# Patient Record
Sex: MALE | Race: WHITE | NOT HISPANIC OR LATINO | URBAN - METROPOLITAN AREA
[De-identification: names, ages, dates, MRNs, and addresses within clinical notes are randomized per-mention and may not be internally consistent; named-entity substitution may affect disease eponyms.]

---

## 2023-04-05 ENCOUNTER — OFFICE VISIT (OUTPATIENT)
Dept: PRIMARY CARE | Facility: CLINIC | Age: 83
End: 2023-04-05
Payer: MEDICARE

## 2023-04-05 VITALS
WEIGHT: 169 LBS | BODY MASS INDEX: 25.61 KG/M2 | OXYGEN SATURATION: 94 % | HEIGHT: 68 IN | SYSTOLIC BLOOD PRESSURE: 138 MMHG | HEART RATE: 124 BPM | DIASTOLIC BLOOD PRESSURE: 62 MMHG

## 2023-04-05 DIAGNOSIS — I15.2 HYPERTENSION ASSOCIATED WITH DIABETES (MULTI): ICD-10-CM

## 2023-04-05 DIAGNOSIS — I25.810 CORONARY ARTERY DISEASE INVOLVING CORONARY BYPASS GRAFT OF NATIVE HEART WITHOUT ANGINA PECTORIS: ICD-10-CM

## 2023-04-05 DIAGNOSIS — E11.59 HYPERTENSION ASSOCIATED WITH DIABETES (MULTI): ICD-10-CM

## 2023-04-05 DIAGNOSIS — E11.51 DM (DIABETES MELLITUS) TYPE II, CONTROLLED, WITH PERIPHERAL VASCULAR DISORDER (MULTI): ICD-10-CM

## 2023-04-05 DIAGNOSIS — A49.9 BACTERIAL INFECTION: ICD-10-CM

## 2023-04-05 DIAGNOSIS — R10.9 FLANK PAIN: Primary | ICD-10-CM

## 2023-04-05 DIAGNOSIS — F51.04 CHRONIC INSOMNIA: ICD-10-CM

## 2023-04-05 DIAGNOSIS — C83.38 DIFFUSE LARGE B-CELL LYMPHOMA, LYMPH NODES OF MULTIPLE SITES (MULTI): ICD-10-CM

## 2023-04-05 DIAGNOSIS — R05.9 COUGH, UNSPECIFIED TYPE: ICD-10-CM

## 2023-04-05 PROBLEM — I25.10 CAD (CORONARY ARTERY DISEASE): Status: ACTIVE | Noted: 2023-04-05

## 2023-04-05 PROBLEM — K21.9 GERD (GASTROESOPHAGEAL REFLUX DISEASE): Status: ACTIVE | Noted: 2023-04-05

## 2023-04-05 PROBLEM — N40.0 BPH (BENIGN PROSTATIC HYPERPLASIA): Status: ACTIVE | Noted: 2023-04-05

## 2023-04-05 PROBLEM — K51.90 ULCERATIVE COLITIS (MULTI): Status: ACTIVE | Noted: 2020-08-28

## 2023-04-05 PROBLEM — E78.2 COMBINED HYPERLIPIDEMIA ASSOCIATED WITH TYPE 2 DIABETES MELLITUS (MULTI): Status: ACTIVE | Noted: 2023-04-05

## 2023-04-05 PROBLEM — E11.69 COMBINED HYPERLIPIDEMIA ASSOCIATED WITH TYPE 2 DIABETES MELLITUS (MULTI): Status: ACTIVE | Noted: 2023-04-05

## 2023-04-05 PROCEDURE — 3075F SYST BP GE 130 - 139MM HG: CPT | Performed by: EMERGENCY MEDICINE

## 2023-04-05 PROCEDURE — 99214 OFFICE O/P EST MOD 30 MIN: CPT | Performed by: EMERGENCY MEDICINE

## 2023-04-05 PROCEDURE — 1036F TOBACCO NON-USER: CPT | Performed by: EMERGENCY MEDICINE

## 2023-04-05 PROCEDURE — 1159F MED LIST DOCD IN RCRD: CPT | Performed by: EMERGENCY MEDICINE

## 2023-04-05 PROCEDURE — 1157F ADVNC CARE PLAN IN RCRD: CPT | Performed by: EMERGENCY MEDICINE

## 2023-04-05 PROCEDURE — 3078F DIAST BP <80 MM HG: CPT | Performed by: EMERGENCY MEDICINE

## 2023-04-05 RX ORDER — SPIRONOLACTONE AND HYDROCHLOROTHIAZIDE 25; 25 MG/1; MG/1
1 TABLET ORAL
COMMUNITY
Start: 2020-07-23 | End: 2024-02-16 | Stop reason: ALTCHOICE

## 2023-04-05 RX ORDER — LISINOPRIL 20 MG/1
20 TABLET ORAL DAILY
COMMUNITY
Start: 2018-11-17

## 2023-04-05 RX ORDER — RIVAROXABAN 20 MG/1
20 TABLET, FILM COATED ORAL DAILY
COMMUNITY
Start: 2014-06-01

## 2023-04-05 RX ORDER — NITROGLYCERIN 0.4 MG/1
0.4 TABLET SUBLINGUAL
COMMUNITY
Start: 2019-02-20

## 2023-04-05 ASSESSMENT — PATIENT HEALTH QUESTIONNAIRE - PHQ9
2. FEELING DOWN, DEPRESSED OR HOPELESS: NOT AT ALL
1. LITTLE INTEREST OR PLEASURE IN DOING THINGS: NOT AT ALL
SUM OF ALL RESPONSES TO PHQ9 QUESTIONS 1 AND 2: 0

## 2023-04-05 NOTE — PROGRESS NOTES
Subjective   Patient ID: Moshe Velázquez is a 82 y.o. male who presents for Follow-up (Follow up, Has only had 5 hr sleep. Had colonoscopy done, has sore throat now is feeling like he is getting sick /).    Assessment/Plan   Problem List Items Addressed This Visit          Nervous    Flank pain - Primary    Chronic insomnia       Circulatory    CAD (coronary artery disease)    Relevant Medications    nitroglycerin (Nitrostat) 0.4 mg SL tablet    DM (diabetes mellitus) type II, controlled, with peripheral vascular disorder (CMS/HCC)    Hypertension associated with diabetes (CMS/HCC)       Other    Diffuse large b-cell lymphoma, lymph nodes of multiple sites (CMS/HCC)     Cough- z-gomez ordered   May use OTC medications as needed     Right flank pain- Improving with physical therapy, CT abd pelvis negative.    BPH- on Flomax    CAD- s/p CABG August 2018. Follows with Dr. Jefferson    Diabetes Type 2- Stable. Diet controlled.     History of Burkitt's lymphoma/leukemia- Stable. Following with Dr. Johnson     DVT- on Xarelto    Hypertension- on Lisinopril 20mg, Norvasc 5 mg and Aldactone-HCTZ 25-25mg daily.     Hyperlipidemia- continue Simvastatin and Fenofibrate    Insomnia- on ambien     Follow up in 3 months or sooner as needed     Source of history: Nurse, Medical personnel, Medical record, Patient.  History limitation: None.    HPI  Right flank pain improve with physical therapy. Has not experienced pain in over a week.   CT completed on 1/25/22, negative.     Recently had EGD done with GI. Prior to procedure he felt as if he was getting a cold. Since procedure sore throat and cough have worsened. Patient has been unable to sleep due to symptoms.     Reports that he has a calcification on his kidney that has been watched by Dr. Johnson for some time.     History of CAD s/p CABG, DVT (on xarelto), HTN, HLD, DM2, Burkitt's lymphoma, Insomnia.     Social history- quit smoking 15 years ago, no EtOH, or illicit drug use.  "    Surgical history- CABGx3,PCI, Appendectomy, Colectomy       No Known Allergies    Current Outpatient Medications   Medication Sig Dispense Refill    aspirin 81 mg capsule Take by mouth.      lisinopril 20 mg tablet 1 tablet (20 mg).      nitroglycerin (Nitrostat) 0.4 mg SL tablet Place 1 tablet (0.4 mg) under the tongue.      spironolacton-hydrochlorothiaz (Aldactazide) 25-25 mg tablet Take 1 tablet (25 mg) by mouth.      Xarelto 20 mg tablet 1 tablet (20 mg).      folic acid/multivit-min/lutein (CENTRUM SILVER ORAL)        No current facility-administered medications for this visit.       Objective   Visit Vitals  /62   Pulse (!) 124   Ht 1.727 m (5' 8\")   Wt 76.7 kg (169 lb)   SpO2 94%   BMI 25.70 kg/m²   Smoking Status Former   BSA 1.92 m²     Physical Exam  Vital signs as per nursing/MA documentation   General appearance: Alert and in no acute distress  HEENT: Normal Inspection   Neck: Normal Inspection   Respiratory: No respiratory distress Lungs are clear   Cardiovascular: Heart rate normal. No gallop  Back: Normal Inspection   Skin inspection: Warm   Musculoskeletal: No deformities   Neuro: Limited exam. Baseline    Review of Systems   Comprehensive review of systems as allowed by patient condition and nursing input is negative    No visits with results within 4 Month(s) from this visit.   Latest known visit with results is:   Legacy Encounter on 05/19/2021   Component Date Value Ref Range Status    Glucose 05/19/2021 106 (H)  74 - 99 mg/dL Final    Sodium 05/19/2021 140  136 - 145 mmol/L Final    Potassium 05/19/2021 4.9  3.5 - 5.3 mmol/L Final    Chloride 05/19/2021 103  98 - 107 mmol/L Final    Bicarbonate 05/19/2021 26  21 - 32 mmol/L Final    Anion Gap 05/19/2021 16  10 - 20 mmol/L Final    Urea Nitrogen 05/19/2021 25 (H)  6 - 23 mg/dL Final    Creatinine 05/19/2021 1.22  0.50 - 1.30 mg/dL Final    GLOMERULAR FILTRATION RATE-NON AFR* 05/19/2021 57 (A)  >60 mL/min/1.73m2 Final    GLOMERULAR " FILTRATION RATE-* 05/19/2021 69  >60 mL/min/1.73m2 Final    Calcium 05/19/2021 10.1  8.6 - 10.6 mg/dL Final    Albumin 05/19/2021 4.3  3.4 - 5.0 g/dL Final    Alkaline Phosphatase 05/19/2021 25 (L)  33 - 136 U/L Final    Total Protein 05/19/2021 6.5  6.4 - 8.2 g/dL Final    AST 05/19/2021 16  9 - 39 U/L Final    Total Bilirubin 05/19/2021 0.6  0.0 - 1.2 mg/dL Final    ALT (SGPT) 05/19/2021 14  10 - 52 U/L Final    Cholesterol 05/19/2021 132  0 - 199 mg/dL Final    HDL 05/19/2021 51.6  mg/dL Final    Cholesterol/HDL Ratio 05/19/2021 2.6   Final    LDL 05/19/2021 60  0 - 99 mg/dL Final    VLDL 05/19/2021 20  0 - 40 mg/dL Final    Triglycerides 05/19/2021 100  0 - 149 mg/dL Final    WBC 05/19/2021 7.3  4.4 - 11.3 x10E9/L Final    nRBC 05/19/2021 0.0  0.0 - 0.0 /100 WBC Final    RBC 05/19/2021 3.91 (L)  4.50 - 5.90 x10E12/L Final    Hemoglobin 05/19/2021 12.5 (L)  13.5 - 17.5 g/dL Final    Hematocrit 05/19/2021 38.9 (L)  41.0 - 52.0 % Final    MCV 05/19/2021 99  80 - 100 fL Final    MCHC 05/19/2021 32.1  32.0 - 36.0 g/dL Final    Platelets 05/19/2021 266  150 - 450 x10E9/L Final    RDW 05/19/2021 14.2  11.5 - 14.5 % Final    Vitamin D, 25-Hydroxy 05/19/2021 28 (A)  ng/mL Final    Hemoglobin A1C 05/19/2021 6.1  % Final    Estimated Average Glucose 05/19/2021 128  MG/DL Final    DRUG SCREEN COMMENT URINE 05/19/2021 SEE BELOW   Final    Creatine, Urine 05/19/2021 94.4  mg/dL Final    Amphetamine Screen, Urine 05/19/2021 PRESUMPTIVE NEGATIVE  NEGATIVE Final    Barbiturate Screen, Urine 05/19/2021 PRESUMPTIVE NEGATIVE  NEGATIVE Final    Cannabinoid Screen, Urine 05/19/2021 PRESUMPTIVE NEGATIVE  NEGATIVE Final    Cocaine Screen, Urine 05/19/2021 PRESUMPTIVE NEGATIVE  NEGATIVE Final    PCP Screen, Urine 05/19/2021 PRESUMPTIVE NEGATIVE  NEGATIVE Final    7-Aminoclonazepam 05/19/2021 <25  Cutoff <25 ng/mL Final    Alpha-Hydroxyalprazolam 05/19/2021 <25  Cutoff <25 ng/mL Final    Alpha-Hydroxymidazolam 05/19/2021 <25   Cutoff <25 ng/mL Final    Alprazolam 05/19/2021 <25  Cutoff <25 ng/mL Final    Chlordiazepoxide 05/19/2021 <25  Cutoff <25 ng/mL Final    Clonazepam 05/19/2021 <25  Cutoff <25 ng/mL Final    Diazepam 05/19/2021 <25  Cutoff <25 ng/mL Final    Lorazepam 05/19/2021 <25  Cutoff <25 ng/mL Final    Midazolam 05/19/2021 <25  Cutoff <25 ng/mL Final    Nordiazepam 05/19/2021 <25  Cutoff <25 ng/mL Final    Oxazepam 05/19/2021 <25  Cutoff <25 ng/mL Final    Temazepam 05/19/2021 <25  Cutoff <25 ng/mL Final    Zolpidem 05/19/2021 <25  Cutoff <25 ng/mL Final    Zolpidem Metabolite (ZCA) 05/19/2021 183 (A)  Cutoff <25 ng/mL Final    6-Acetylmorphine 05/19/2021 <25  Cutoff <25 ng/mL Final    Codeine 05/19/2021 <50  Cutoff <50 ng/mL Final    Hydrocodone 05/19/2021 <25  Cutoff <25 ng/mL Final    Hydromorphone 05/19/2021 <25  Cutoff <25 ng/mL Final    Morphine Urine 05/19/2021 <50  Cutoff <50 ng/mL Final    Norhydrocodone 05/19/2021 <25  Cutoff <25 ng/mL Final    Noroxycodone 05/19/2021 <25  Cutoff <25 ng/mL Final    Oxycodone 05/19/2021 <25  Cutoff <25 ng/mL Final    Oxymorphone 05/19/2021 <25  Cutoff <25 ng/mL Final    Tramadol 05/19/2021 <50  Cutoff <50 ng/mL Final    O-Desmethyltramadol 05/19/2021 <50  Cutoff <50 ng/mL Final    Fentanyl 05/19/2021 <2.5  Cutoff<2.5 ng/mL Final    Norfentanyl 05/19/2021 <2.5  Cutoff<2.5 ng/mL Final    METHADONE CONFIRMATION,URINE 05/19/2021 <25  Cutoff <25 ng/mL Final    EDDP 05/19/2021 <25  Cutoff <25 ng/mL Final    TSH 05/19/2021 1.50  0.44 - 3.98 mIU/L Final    Prostate Specific Antigen,Screen 05/19/2021 0.89  0.00 - 4.00 ng/mL Final       Radiology: Reviewed imaging in powerchart.  No results found.    No family history on file.  Social History     Socioeconomic History    Marital status:      Spouse name: None    Number of children: None    Years of education: None    Highest education level: None   Occupational History    None   Tobacco Use    Smoking status: Former     Types:  Cigarettes    Smokeless tobacco: Never   Vaping Use    Vaping status: None   Substance and Sexual Activity    Alcohol use: Not Currently    Drug use: None    Sexual activity: None   Other Topics Concern    None   Social History Narrative    None     Social Determinants of Health     Financial Resource Strain: Not on file   Food Insecurity: Not on file   Transportation Needs: Not on file   Physical Activity: Not on file   Stress: Not on file   Social Connections: Not on file   Intimate Partner Violence: Not on file   Housing Stability: Not on file     Past Medical History:   Diagnosis Date    Diverticulosis of intestine, part unspecified, without perforation or abscess without bleeding     Diverticulosis    Other hemorrhoids     Internal hemorrhoids    Personal history of colonic polyps     History of colonic polyps    Personal history of other diseases of the respiratory system     History of sinusitis    Personal history of other venous thrombosis and embolism     History of deep venous thrombosis    Personal history of pulmonary embolism     Personal history of pulmonary embolism     Past Surgical History:   Procedure Laterality Date    CHOLECYSTECTOMY  06/23/2014    Cholecystectomy    TONSILLECTOMY  06/23/2014    Tonsillectomy       Scribe Attestation  By signing my name below, ITanya Scribe   attest that this documentation has been prepared under the direction and in the presence of Isidro Fry MD.

## 2023-04-06 ENCOUNTER — TELEPHONE (OUTPATIENT)
Dept: PRIMARY CARE | Facility: CLINIC | Age: 83
End: 2023-04-06
Payer: MEDICARE

## 2023-04-06 RX ORDER — AZITHROMYCIN 250 MG/1
250 TABLET, FILM COATED ORAL DAILY
Qty: 6 TABLET | Refills: 0 | Status: SHIPPED | OUTPATIENT
Start: 2023-04-06 | End: 2023-04-11

## 2023-04-06 NOTE — ADDENDUM NOTE
Addended by: WEST MULLER on: 4/6/2023 03:57 PM     Modules accepted: Orders    
[facility] [phone]. Initial visit will be day after discharge home. A nurse will call prior to home visit

## 2023-04-06 NOTE — TELEPHONE ENCOUNTER
Pt was told that zpak would be sent to the  pharmacy but it hasnt been sent yet  Pt is extremly upset  [facility]

## 2023-04-10 ENCOUNTER — OFFICE VISIT (OUTPATIENT)
Dept: PRIMARY CARE | Facility: CLINIC | Age: 83
End: 2023-04-10
Payer: MEDICARE

## 2023-04-10 VITALS
OXYGEN SATURATION: 93 % | BODY MASS INDEX: 25.34 KG/M2 | DIASTOLIC BLOOD PRESSURE: 63 MMHG | WEIGHT: 167.2 LBS | HEART RATE: 91 BPM | HEIGHT: 68 IN | SYSTOLIC BLOOD PRESSURE: 108 MMHG

## 2023-04-10 DIAGNOSIS — E78.2 COMBINED HYPERLIPIDEMIA ASSOCIATED WITH TYPE 2 DIABETES MELLITUS (MULTI): ICD-10-CM

## 2023-04-10 DIAGNOSIS — R10.9 FLANK PAIN: ICD-10-CM

## 2023-04-10 DIAGNOSIS — I25.810 ATHEROSCLEROSIS OF CORONARY ARTERY BYPASS GRAFT OF NATIVE HEART WITHOUT ANGINA PECTORIS: ICD-10-CM

## 2023-04-10 DIAGNOSIS — I15.2 HYPERTENSION ASSOCIATED WITH DIABETES (MULTI): ICD-10-CM

## 2023-04-10 DIAGNOSIS — E11.69 COMBINED HYPERLIPIDEMIA ASSOCIATED WITH TYPE 2 DIABETES MELLITUS (MULTI): ICD-10-CM

## 2023-04-10 DIAGNOSIS — A49.9 BACTERIAL INFECTION: Primary | ICD-10-CM

## 2023-04-10 DIAGNOSIS — E11.59 HYPERTENSION ASSOCIATED WITH DIABETES (MULTI): ICD-10-CM

## 2023-04-10 DIAGNOSIS — E11.51 DM (DIABETES MELLITUS) TYPE II, CONTROLLED, WITH PERIPHERAL VASCULAR DISORDER (MULTI): ICD-10-CM

## 2023-04-10 DIAGNOSIS — F51.04 CHRONIC INSOMNIA: ICD-10-CM

## 2023-04-10 PROCEDURE — 1159F MED LIST DOCD IN RCRD: CPT | Performed by: EMERGENCY MEDICINE

## 2023-04-10 PROCEDURE — 99213 OFFICE O/P EST LOW 20 MIN: CPT | Performed by: EMERGENCY MEDICINE

## 2023-04-10 PROCEDURE — 3074F SYST BP LT 130 MM HG: CPT | Performed by: EMERGENCY MEDICINE

## 2023-04-10 PROCEDURE — 1157F ADVNC CARE PLAN IN RCRD: CPT | Performed by: EMERGENCY MEDICINE

## 2023-04-10 PROCEDURE — 3078F DIAST BP <80 MM HG: CPT | Performed by: EMERGENCY MEDICINE

## 2023-04-10 RX ORDER — DOXYCYCLINE HYCLATE 100 MG
100 TABLET ORAL 2 TIMES DAILY
Qty: 20 TABLET | Refills: 0 | Status: SHIPPED | OUTPATIENT
Start: 2023-04-10 | End: 2023-04-20

## 2023-04-10 ASSESSMENT — PATIENT HEALTH QUESTIONNAIRE - PHQ9
1. LITTLE INTEREST OR PLEASURE IN DOING THINGS: NOT AT ALL
SUM OF ALL RESPONSES TO PHQ9 QUESTIONS 1 AND 2: 0
2. FEELING DOWN, DEPRESSED OR HOPELESS: NOT AT ALL

## 2023-04-10 NOTE — PROGRESS NOTES
Subjective   Patient ID: Moshe Velázquez is a 82 y.o. male who presents for Nasal Congestion (Here for congestion/ runny nose, has been having trouble sleeping. Has been on antibiotic for 5 days, feels like it has not been working. ).    Assessment/Plan   Problem List Items Addressed This Visit          Nervous    Flank pain    Chronic insomnia       Circulatory    Atherosclerosis of coronary artery bypass graft of native heart without angina pectoris    DM (diabetes mellitus) type II, controlled, with peripheral vascular disorder (CMS/HCC)    Hypertension associated with diabetes (CMS/HCC)       Endocrine/Metabolic    Combined hyperlipidemia associated with type 2 diabetes mellitus (CMS/HCC)     Other Visit Diagnoses       Bacterial infection    -  Primary    Relevant Medications    doxycycline (Vibra-Tabs) 100 mg tablet          Cough- Doxy ordered   Completed z-gomez without significant symptom improvement     Right flank pain- Improving with physical therapy, CT abd pelvis negative.    BPH- on Flomax    CAD- s/p CABG August 2018. Follows with Dr. Jefferson    Diabetes Type 2- Stable. Diet controlled.     History of Burkitt's lymphoma/leukemia- Stable. Following with Dr. Johnson     DVT- on Xarelto    Hypertension- on Lisinopril 20mg, Norvasc 5 mg and Aldactone-HCTZ 25-25mg daily.     Hyperlipidemia- continue Simvastatin and Fenofibrate    Insomnia- on ambien     Follow up in 3 months or sooner as needed     Source of history: Nurse, Medical personnel, Medical record, Patient.  History limitation: None.    HPI  Patient started on z-gomez last week for acute bronchitis. Completed antibiotic course but does not feel that his symptoms have improved.   Has been unable to sleep for long than 2 hours at a time due to persistent cough.     Reports that he has a calcification on his kidney that has been watched by Dr. Johnson for some time.     History of CAD s/p CABG, DVT (on xarelto), HTN, HLD, DM2, Burkitt's lymphoma,  "Insomnia.     Social history- quit smoking 15 years ago, no EtOH, or illicit drug use.     Surgical history- CABGx3,PCI, Appendectomy, Colectomy       No Known Allergies    Current Outpatient Medications   Medication Sig Dispense Refill    aspirin 81 mg capsule Take by mouth.      azithromycin (Zithromax) 250 mg tablet Take 1 tablet (250 mg) by mouth once daily for 5 days. Two pills on first day followed by one pill for the next four days 6 tablet 0    folic acid/multivit-min/lutein (CENTRUM SILVER ORAL)       lisinopril 20 mg tablet 1 tablet (20 mg).      spironolacton-hydrochlorothiaz (Aldactazide) 25-25 mg tablet Take 1 tablet (25 mg) by mouth.      Xarelto 20 mg tablet 1 tablet (20 mg).      doxycycline (Vibra-Tabs) 100 mg tablet Take 1 tablet (100 mg) by mouth in the morning and 1 tablet (100 mg) before bedtime. Do all this for 10 days. Take with a full glass of water and do not lie down for at least 30 minutes after.. 20 tablet 0    nitroglycerin (Nitrostat) 0.4 mg SL tablet Place 1 tablet (0.4 mg) under the tongue.       No current facility-administered medications for this visit.       Objective   Visit Vitals  /63   Pulse 91   Ht 1.727 m (5' 8\")   Wt 75.8 kg (167 lb 3.2 oz)   SpO2 93%   BMI 25.42 kg/m²   Smoking Status Some Days   BSA 1.91 m²     Physical Exam  Vital signs as per nursing/MA documentation   General appearance: Alert and in no acute distress  HEENT: Normal Inspection   Neck: Normal Inspection   Respiratory: No respiratory distress Lungs are clear   Cardiovascular: Heart rate normal. No gallop  Back: Normal Inspection   Skin inspection: Warm   Musculoskeletal: No deformities   Neuro: Limited exam. Baseline    Review of Systems   Comprehensive review of systems as allowed by patient condition and nursing input is negative    No visits with results within 4 Month(s) from this visit.   Latest known visit with results is:   Legacy Encounter on 05/19/2021   Component Date Value Ref Range " Status    Glucose 05/19/2021 106 (H)  74 - 99 mg/dL Final    Sodium 05/19/2021 140  136 - 145 mmol/L Final    Potassium 05/19/2021 4.9  3.5 - 5.3 mmol/L Final    Chloride 05/19/2021 103  98 - 107 mmol/L Final    Bicarbonate 05/19/2021 26  21 - 32 mmol/L Final    Anion Gap 05/19/2021 16  10 - 20 mmol/L Final    Urea Nitrogen 05/19/2021 25 (H)  6 - 23 mg/dL Final    Creatinine 05/19/2021 1.22  0.50 - 1.30 mg/dL Final    GLOMERULAR FILTRATION RATE-NON AFR* 05/19/2021 57 (A)  >60 mL/min/1.73m2 Final    GLOMERULAR FILTRATION RATE-* 05/19/2021 69  >60 mL/min/1.73m2 Final    Calcium 05/19/2021 10.1  8.6 - 10.6 mg/dL Final    Albumin 05/19/2021 4.3  3.4 - 5.0 g/dL Final    Alkaline Phosphatase 05/19/2021 25 (L)  33 - 136 U/L Final    Total Protein 05/19/2021 6.5  6.4 - 8.2 g/dL Final    AST 05/19/2021 16  9 - 39 U/L Final    Total Bilirubin 05/19/2021 0.6  0.0 - 1.2 mg/dL Final    ALT (SGPT) 05/19/2021 14  10 - 52 U/L Final    Cholesterol 05/19/2021 132  0 - 199 mg/dL Final    HDL 05/19/2021 51.6  mg/dL Final    Cholesterol/HDL Ratio 05/19/2021 2.6   Final    LDL 05/19/2021 60  0 - 99 mg/dL Final    VLDL 05/19/2021 20  0 - 40 mg/dL Final    Triglycerides 05/19/2021 100  0 - 149 mg/dL Final    WBC 05/19/2021 7.3  4.4 - 11.3 x10E9/L Final    nRBC 05/19/2021 0.0  0.0 - 0.0 /100 WBC Final    RBC 05/19/2021 3.91 (L)  4.50 - 5.90 x10E12/L Final    Hemoglobin 05/19/2021 12.5 (L)  13.5 - 17.5 g/dL Final    Hematocrit 05/19/2021 38.9 (L)  41.0 - 52.0 % Final    MCV 05/19/2021 99  80 - 100 fL Final    MCHC 05/19/2021 32.1  32.0 - 36.0 g/dL Final    Platelets 05/19/2021 266  150 - 450 x10E9/L Final    RDW 05/19/2021 14.2  11.5 - 14.5 % Final    Vitamin D, 25-Hydroxy 05/19/2021 28 (A)  ng/mL Final    Hemoglobin A1C 05/19/2021 6.1  % Final    Estimated Average Glucose 05/19/2021 128  MG/DL Final    DRUG SCREEN COMMENT URINE 05/19/2021 SEE BELOW   Final    Creatine, Urine 05/19/2021 94.4  mg/dL Final    Amphetamine Screen, Urine  05/19/2021 PRESUMPTIVE NEGATIVE  NEGATIVE Final    Barbiturate Screen, Urine 05/19/2021 PRESUMPTIVE NEGATIVE  NEGATIVE Final    Cannabinoid Screen, Urine 05/19/2021 PRESUMPTIVE NEGATIVE  NEGATIVE Final    Cocaine Screen, Urine 05/19/2021 PRESUMPTIVE NEGATIVE  NEGATIVE Final    PCP Screen, Urine 05/19/2021 PRESUMPTIVE NEGATIVE  NEGATIVE Final    7-Aminoclonazepam 05/19/2021 <25  Cutoff <25 ng/mL Final    Alpha-Hydroxyalprazolam 05/19/2021 <25  Cutoff <25 ng/mL Final    Alpha-Hydroxymidazolam 05/19/2021 <25  Cutoff <25 ng/mL Final    Alprazolam 05/19/2021 <25  Cutoff <25 ng/mL Final    Chlordiazepoxide 05/19/2021 <25  Cutoff <25 ng/mL Final    Clonazepam 05/19/2021 <25  Cutoff <25 ng/mL Final    Diazepam 05/19/2021 <25  Cutoff <25 ng/mL Final    Lorazepam 05/19/2021 <25  Cutoff <25 ng/mL Final    Midazolam 05/19/2021 <25  Cutoff <25 ng/mL Final    Nordiazepam 05/19/2021 <25  Cutoff <25 ng/mL Final    Oxazepam 05/19/2021 <25  Cutoff <25 ng/mL Final    Temazepam 05/19/2021 <25  Cutoff <25 ng/mL Final    Zolpidem 05/19/2021 <25  Cutoff <25 ng/mL Final    Zolpidem Metabolite (ZCA) 05/19/2021 183 (A)  Cutoff <25 ng/mL Final    6-Acetylmorphine 05/19/2021 <25  Cutoff <25 ng/mL Final    Codeine 05/19/2021 <50  Cutoff <50 ng/mL Final    Hydrocodone 05/19/2021 <25  Cutoff <25 ng/mL Final    Hydromorphone 05/19/2021 <25  Cutoff <25 ng/mL Final    Morphine Urine 05/19/2021 <50  Cutoff <50 ng/mL Final    Norhydrocodone 05/19/2021 <25  Cutoff <25 ng/mL Final    Noroxycodone 05/19/2021 <25  Cutoff <25 ng/mL Final    Oxycodone 05/19/2021 <25  Cutoff <25 ng/mL Final    Oxymorphone 05/19/2021 <25  Cutoff <25 ng/mL Final    Tramadol 05/19/2021 <50  Cutoff <50 ng/mL Final    O-Desmethyltramadol 05/19/2021 <50  Cutoff <50 ng/mL Final    Fentanyl 05/19/2021 <2.5  Cutoff<2.5 ng/mL Final    Norfentanyl 05/19/2021 <2.5  Cutoff<2.5 ng/mL Final    METHADONE CONFIRMATION,URINE 05/19/2021 <25  Cutoff <25 ng/mL Final    EDDP 05/19/2021 <25   Cutoff <25 ng/mL Final    TSH 05/19/2021 1.50  0.44 - 3.98 mIU/L Final    Prostate Specific Antigen,Screen 05/19/2021 0.89  0.00 - 4.00 ng/mL Final       Radiology: Reviewed imaging in powerchart.  No results found.    No family history on file.  Social History     Socioeconomic History    Marital status:      Spouse name: None    Number of children: None    Years of education: None    Highest education level: None   Occupational History    None   Tobacco Use    Smoking status: Some Days     Types: Cigarettes    Smokeless tobacco: Never   Vaping Use    Vaping status: None   Substance and Sexual Activity    Alcohol use: Not Currently    Drug use: None    Sexual activity: None   Other Topics Concern    None   Social History Narrative    None     Social Determinants of Health     Financial Resource Strain: Not on file   Food Insecurity: Not on file   Transportation Needs: Not on file   Physical Activity: Not on file   Stress: Not on file   Social Connections: Not on file   Intimate Partner Violence: Not on file   Housing Stability: Not on file     Past Medical History:   Diagnosis Date    Diverticulosis of intestine, part unspecified, without perforation or abscess without bleeding     Diverticulosis    Other hemorrhoids     Internal hemorrhoids    Personal history of colonic polyps     History of colonic polyps    Personal history of other diseases of the respiratory system     History of sinusitis    Personal history of other venous thrombosis and embolism     History of deep venous thrombosis    Personal history of pulmonary embolism     Personal history of pulmonary embolism     Past Surgical History:   Procedure Laterality Date    CHOLECYSTECTOMY  06/23/2014    Cholecystectomy    TONSILLECTOMY  06/23/2014    Tonsillectomy       Scribe Attestation  By signing my name below, Tanya MANJARREZ Scribe   attest that this documentation has been prepared under the direction and in the presence of Isidro Fry  MD.

## 2023-05-02 LAB
NON-UH HIE A/G RATIO: 1.6
NON-UH HIE ALB: 4.2 G/DL (ref 3.4–5)
NON-UH HIE ALK PHOS: 35 UNIT/L (ref 46–116)
NON-UH HIE BASO COUNT: 0.06 X1000 (ref 0–0.2)
NON-UH HIE BASOS %: 0.5 %
NON-UH HIE BILIRUBIN, TOTAL: 0.8 MG/DL (ref 0.2–1)
NON-UH HIE BUN/CREAT RATIO: 17.7
NON-UH HIE BUN: 23 MG/DL (ref 9–23)
NON-UH HIE CALCIUM: 9.6 MG/DL (ref 8.7–10.4)
NON-UH HIE CALCULATED OSMOLALITY: 283 MOSM/KG (ref 275–295)
NON-UH HIE CHLORIDE: 104 MMOL/L (ref 98–107)
NON-UH HIE CO2, VENOUS: 28 MMOL/L (ref 20–31)
NON-UH HIE CREATININE: 1.3 MG/DL (ref 0.6–1.1)
NON-UH HIE DIFF?: NO
NON-UH HIE EOS COUNT: 0.08 X1000 (ref 0–0.5)
NON-UH HIE EOSIN %: 0.7 %
NON-UH HIE GFR AA: >60
NON-UH HIE GLOBULIN: 2.7 G/DL
NON-UH HIE GLOMERULAR FILTRATION RATE: 53 ML/MIN/1.73M?
NON-UH HIE GLUCOSE: 168 MG/DL (ref 74–106)
NON-UH HIE GOT: 31 UNIT/L (ref 15–37)
NON-UH HIE GPT: 21 UNIT/L (ref 10–49)
NON-UH HIE HCT: 45 % (ref 41–52)
NON-UH HIE HGB: 14.8 G/DL (ref 13.5–17.5)
NON-UH HIE INSTR WBC: 11.9
NON-UH HIE K: 4.9 MMOL/L (ref 3.5–5.1)
NON-UH HIE LYMPH %: 6.2 %
NON-UH HIE LYMPH COUNT: 0.74 X1000 (ref 1.2–4.8)
NON-UH HIE MCH: 32.7 PG (ref 27–34)
NON-UH HIE MCHC: 33 G/DL (ref 32–37)
NON-UH HIE MCV: 99.3 FL (ref 80–100)
NON-UH HIE MONO %: 5.1 %
NON-UH HIE MONO COUNT: 0.6 X1000 (ref 0.1–1)
NON-UH HIE MPV: 9.1 FL (ref 7.4–10.4)
NON-UH HIE NA: 138 MMOL/L (ref 135–145)
NON-UH HIE NEUTROPHIL %: 87.5 %
NON-UH HIE NEUTROPHIL COUNT (ANC): 10.4 X1000 (ref 1.4–8.8)
NON-UH HIE NUCLEATED RBC: 0 /100WBC
NON-UH HIE PLATELET: 251 X10 (ref 150–450)
NON-UH HIE RBC: 4.53 X10 (ref 4.7–6.1)
NON-UH HIE RDW: 14.4 % (ref 11.5–14.5)
NON-UH HIE TOTAL PROTEIN: 6.9 G/DL (ref 5.7–8.2)
NON-UH HIE WBC: 11.9 X10 (ref 4.5–11)

## 2023-08-30 RX ORDER — ZOLPIDEM TARTRATE 5 MG/1
5 TABLET ORAL NIGHTLY PRN
COMMUNITY
Start: 2020-08-18 | End: 2024-02-16 | Stop reason: ALTCHOICE

## 2023-09-08 ENCOUNTER — TELEMEDICINE (OUTPATIENT)
Dept: PRIMARY CARE | Facility: CLINIC | Age: 83
End: 2023-09-08
Payer: MEDICARE

## 2023-09-08 VITALS — HEART RATE: 100 BPM | WEIGHT: 160 LBS | BODY MASS INDEX: 24.33 KG/M2

## 2023-09-08 DIAGNOSIS — I25.810 ATHEROSCLEROSIS OF CORONARY ARTERY BYPASS GRAFT OF NATIVE HEART WITHOUT ANGINA PECTORIS: ICD-10-CM

## 2023-09-08 DIAGNOSIS — G47.00 INSOMNIA, UNSPECIFIED TYPE: Primary | ICD-10-CM

## 2023-09-08 DIAGNOSIS — E11.59 HYPERTENSION ASSOCIATED WITH DIABETES (MULTI): ICD-10-CM

## 2023-09-08 DIAGNOSIS — I25.810 CORONARY ARTERY DISEASE INVOLVING CORONARY BYPASS GRAFT OF NATIVE HEART WITHOUT ANGINA PECTORIS: ICD-10-CM

## 2023-09-08 DIAGNOSIS — I15.2 HYPERTENSION ASSOCIATED WITH DIABETES (MULTI): ICD-10-CM

## 2023-09-08 DIAGNOSIS — I10 BENIGN ESSENTIAL HYPERTENSION: ICD-10-CM

## 2023-09-08 PROCEDURE — 99213 OFFICE O/P EST LOW 20 MIN: CPT | Performed by: EMERGENCY MEDICINE

## 2023-09-08 RX ORDER — TRAZODONE HYDROCHLORIDE 100 MG/1
100 TABLET ORAL NIGHTLY PRN
Qty: 30 TABLET | Refills: 0 | Status: SHIPPED | OUTPATIENT
Start: 2023-09-08 | End: 2023-10-03

## 2023-09-08 ASSESSMENT — ENCOUNTER SYMPTOMS
BACK PAIN: 1
INSOMNIA: 1

## 2023-09-08 NOTE — PROGRESS NOTES
Subjective   Patient ID: Moshe Velázquez is a 83 y.o. male who presents for Insomnia and Back Pain.    Assessment/Plan   Problem List Items Addressed This Visit    None  Visit Diagnoses       Insomnia, unspecified type    -  Primary    Relevant Medications    traZODone (Desyrel) 100 mg tablet          Insomnia- Trazodone    Right flank pain- Improving with physical therapy, CT abd pelvis negative.    BPH- on Flomax    CAD- s/p CABG August 2018. Follows with Dr. Jefferson    Diabetes Type 2- Stable. Diet controlled.     History of Burkitt's lymphoma/leukemia- Stable. Following with Dr. Johnson     DVT- on Xarelto    Hypertension- on Lisinopril 20mg, Norvasc 5 mg and Aldactone-HCTZ 25-25mg daily.     Hyperlipidemia- continue Simvastatin and Fenofibrate      Follow up in 3 months or sooner as needed     Source of history: Nurse, Medical personnel, Medical record, Patient.  History limitation: None.    Insomnia    Back Pain       This visit was completed virtually due to the restrictions of the COVID-19 pandemic. All issues as below were discussed and addressed but no physical exam was performed. If it was felt that the patient should be evaluated in clinic or ER, then they were directed there. The patient verbally consented to visit      Reports that he has a calcification on his kidney that has been watched by Dr. Johnson for some time.     History of CAD s/p CABG, DVT (on xarelto), HTN, HLD, DM2, Burkitt's lymphoma, Insomnia.     Social history- quit smoking 15 years ago, no EtOH, or illicit drug use.     Surgical history- CABGx3,PCI, Appendectomy, Colectomy       No Known Allergies    Current Outpatient Medications   Medication Sig Dispense Refill    aspirin 81 mg capsule Take by mouth.      folic acid/multivit-min/lutein (CENTRUM SILVER ORAL)       lisinopril 20 mg tablet Take 1 tablet (20 mg) by mouth once daily.      nitroglycerin (Nitrostat) 0.4 mg SL tablet Place 1 tablet (0.4 mg) under the tongue.       spironolacton-hydrochlorothiaz (Aldactazide) 25-25 mg tablet Take 1 tablet (25 mg) by mouth.      tamsulosin (Flomax) 0.4 mg 24 hr capsule TAKE 1 CAPSULE BY MOUTH EVERY DAY 90 capsule 1    zolpidem (Ambien) 5 mg tablet 1 tablet (5 mg) as needed at bedtime.      traZODone (Desyrel) 100 mg tablet Take 1 tablet (100 mg) by mouth as needed at bedtime for sleep. 30 tablet 0    Xarelto 20 mg tablet 1 tablet (20 mg).       No current facility-administered medications for this visit.           Review of Systems   Musculoskeletal:  Positive for back pain.   Psychiatric/Behavioral:  The patient has insomnia.       Comprehensive review of systems as allowed by patient condition and nursing input is negative        Radiology: Reviewed imaging in powerchart.  No results found.    No family history on file.  Social History     Socioeconomic History    Marital status:      Spouse name: Not on file    Number of children: Not on file    Years of education: Not on file    Highest education level: Not on file   Occupational History    Not on file   Tobacco Use    Smoking status: Some Days     Types: Cigarettes    Smokeless tobacco: Never   Substance and Sexual Activity    Alcohol use: Not Currently    Drug use: Not on file    Sexual activity: Not on file   Other Topics Concern    Not on file   Social History Narrative    Not on file     Social Determinants of Health     Financial Resource Strain: Not on file   Food Insecurity: Not on file   Transportation Needs: Not on file   Physical Activity: Not on file   Stress: Not on file   Social Connections: Not on file   Intimate Partner Violence: Not on file   Housing Stability: Not on file     Past Medical History:   Diagnosis Date    Diverticulosis of intestine, part unspecified, without perforation or abscess without bleeding     Diverticulosis    Other hemorrhoids     Internal hemorrhoids    Personal history of colonic polyps     History of colonic polyps    Personal history of  other diseases of the respiratory system     History of sinusitis    Personal history of other venous thrombosis and embolism     History of deep venous thrombosis    Personal history of pulmonary embolism     Personal history of pulmonary embolism     Past Surgical History:   Procedure Laterality Date    CHOLECYSTECTOMY  06/23/2014    Cholecystectomy    TONSILLECTOMY  06/23/2014    Tonsillectomy       Scribe Attestation  By signing my name below, I, Neena Sierra   attest that this documentation has been prepared under the direction and in the presence of Isidro Fry MD.

## 2023-09-22 ENCOUNTER — OFFICE VISIT (OUTPATIENT)
Dept: PRIMARY CARE | Facility: CLINIC | Age: 83
End: 2023-09-22
Payer: MEDICARE

## 2023-09-22 VITALS
HEIGHT: 68 IN | DIASTOLIC BLOOD PRESSURE: 80 MMHG | BODY MASS INDEX: 24.58 KG/M2 | SYSTOLIC BLOOD PRESSURE: 121 MMHG | HEART RATE: 82 BPM | WEIGHT: 162.2 LBS | OXYGEN SATURATION: 95 %

## 2023-09-22 DIAGNOSIS — Z00.00 WELLNESS EXAMINATION: Primary | ICD-10-CM

## 2023-09-22 DIAGNOSIS — K51.919 ULCERATIVE COLITIS WITH COMPLICATION, UNSPECIFIED LOCATION (MULTI): ICD-10-CM

## 2023-09-22 DIAGNOSIS — C83.38 DIFFUSE LARGE B-CELL LYMPHOMA, LYMPH NODES OF MULTIPLE SITES (MULTI): ICD-10-CM

## 2023-09-22 DIAGNOSIS — E11.51 DM (DIABETES MELLITUS) TYPE II, CONTROLLED, WITH PERIPHERAL VASCULAR DISORDER (MULTI): ICD-10-CM

## 2023-09-22 DIAGNOSIS — N40.1 BENIGN PROSTATIC HYPERPLASIA WITH LOWER URINARY TRACT SYMPTOMS, SYMPTOM DETAILS UNSPECIFIED: ICD-10-CM

## 2023-09-22 DIAGNOSIS — K21.9 GASTROESOPHAGEAL REFLUX DISEASE, UNSPECIFIED WHETHER ESOPHAGITIS PRESENT: ICD-10-CM

## 2023-09-22 PROCEDURE — G0444 DEPRESSION SCREEN ANNUAL: HCPCS | Performed by: EMERGENCY MEDICINE

## 2023-09-22 PROCEDURE — 99213 OFFICE O/P EST LOW 20 MIN: CPT | Performed by: EMERGENCY MEDICINE

## 2023-09-22 PROCEDURE — 3079F DIAST BP 80-89 MM HG: CPT | Performed by: EMERGENCY MEDICINE

## 2023-09-22 PROCEDURE — G0446 INTENS BEHAVE THER CARDIO DX: HCPCS | Performed by: EMERGENCY MEDICINE

## 2023-09-22 PROCEDURE — G0442 ANNUAL ALCOHOL SCREEN 15 MIN: HCPCS | Performed by: EMERGENCY MEDICINE

## 2023-09-22 PROCEDURE — G0439 PPPS, SUBSEQ VISIT: HCPCS | Performed by: EMERGENCY MEDICINE

## 2023-09-22 PROCEDURE — 3074F SYST BP LT 130 MM HG: CPT | Performed by: EMERGENCY MEDICINE

## 2023-09-22 PROCEDURE — 1170F FXNL STATUS ASSESSED: CPT | Performed by: EMERGENCY MEDICINE

## 2023-09-22 PROCEDURE — 99497 ADVNCD CARE PLAN 30 MIN: CPT | Performed by: EMERGENCY MEDICINE

## 2023-09-22 PROCEDURE — 1159F MED LIST DOCD IN RCRD: CPT | Performed by: EMERGENCY MEDICINE

## 2023-09-22 PROCEDURE — 99397 PER PM REEVAL EST PAT 65+ YR: CPT | Performed by: EMERGENCY MEDICINE

## 2023-09-22 RX ORDER — TAMSULOSIN HYDROCHLORIDE 0.4 MG/1
0.4 CAPSULE ORAL DAILY
Qty: 90 CAPSULE | Refills: 1 | Status: CANCELLED | OUTPATIENT
Start: 2023-09-22

## 2023-09-22 RX ORDER — GABAPENTIN 300 MG/1
300 CAPSULE ORAL DAILY
COMMUNITY
End: 2024-02-16 | Stop reason: ALTCHOICE

## 2023-09-22 RX ORDER — MULTIVIT-MIN/FERROUS GLUCONATE 9 MG/15 ML
LIQUID (ML) ORAL DAILY
COMMUNITY
End: 2024-02-16 | Stop reason: ALTCHOICE

## 2023-09-22 RX ORDER — FENOFIBRATE 145 MG/1
145 TABLET, FILM COATED ORAL DAILY
COMMUNITY
End: 2024-02-16 | Stop reason: ALTCHOICE

## 2023-09-22 RX ORDER — PANTOPRAZOLE SODIUM 40 MG/1
40 TABLET, DELAYED RELEASE ORAL
COMMUNITY
End: 2024-02-16 | Stop reason: ALTCHOICE

## 2023-09-22 RX ORDER — SIMVASTATIN 20 MG/1
20 TABLET, FILM COATED ORAL NIGHTLY
COMMUNITY

## 2023-09-22 RX ORDER — CYANOCOBALAMIN (VITAMIN B-12) 500 MCG
TABLET ORAL DAILY
COMMUNITY
End: 2024-02-16 | Stop reason: ALTCHOICE

## 2023-09-22 RX ORDER — METOPROLOL SUCCINATE 50 MG/1
50 TABLET, EXTENDED RELEASE ORAL DAILY
COMMUNITY

## 2023-09-22 RX ORDER — TRAMADOL HYDROCHLORIDE 50 MG/1
TABLET ORAL EVERY 6 HOURS PRN
COMMUNITY
End: 2024-02-16 | Stop reason: ALTCHOICE

## 2023-09-22 ASSESSMENT — PATIENT HEALTH QUESTIONNAIRE - PHQ9
SUM OF ALL RESPONSES TO PHQ9 QUESTIONS 1 AND 2: 0
2. FEELING DOWN, DEPRESSED OR HOPELESS: NOT AT ALL
1. LITTLE INTEREST OR PLEASURE IN DOING THINGS: NOT AT ALL

## 2023-09-22 ASSESSMENT — ACTIVITIES OF DAILY LIVING (ADL)
GROCERY_SHOPPING: INDEPENDENT
TAKING_MEDICATION: INDEPENDENT
DOING_HOUSEWORK: INDEPENDENT
MANAGING_FINANCES: INDEPENDENT
BATHING: INDEPENDENT
DRESSING: INDEPENDENT

## 2023-09-22 ASSESSMENT — ENCOUNTER SYMPTOMS
OCCASIONAL FEELINGS OF UNSTEADINESS: 0
BACK PAIN: 1
DEPRESSION: 0
INSOMNIA: 1
LOSS OF SENSATION IN FEET: 0

## 2023-09-22 NOTE — PROGRESS NOTES
Subjective   Patient ID: Moshe Velázquez is a 83 y.o. male who presents for Follow-up (Nerve pain/ back pain a month ago ).    Assessment/Plan   Problem List Items Addressed This Visit       BPH (benign prostatic hyperplasia)     83 years old for Medicare wellness and office visit    Insomnia- Trazodone    Right flank pain- Improving with physical therapy, CT abd pelvis negative.    BPH- on Flomax    CAD- s/p CABG August 2018. Follows with Dr. Jefferson    Diabetes Type 2- Stable. Diet controlled.     History of Burkitt's lymphoma/leukemia- Stable. Following with Dr. Johnson     DVT- on Xarelto    Hypertension- on Lisinopril 20mg, Norvasc 5 mg and Aldactone-HCTZ 25-25mg daily.     Hyperlipidemia- continue Simvastatin and Fenofibrate    I discussed advanced care planning including the explanation and discussion of advanced directives. If patient does not have current up to date documents, examples and information provided on how to create both living will and power of . Patient was encouraged to work on completing these documents.  Information and advise was also provided on DO NOT RESUSCITATE and patient encouraged to consider this      PH Q-9 depression screening was completed by authorized employee of the practice and  explained the questionnaire and discussed the answers with the patient.    I screened for alcohol use    We had face-to-face discussion with this patient about the cardiovascular risk and behavioral therapies of nutritional choices, exercise and elimination of habits contradicting to the risk. We agreed on how they may be able to reduce their current cardiovascular risk.      Follow up in 3 months or sooner as needed     Source of history: Nurse, Medical personnel, Medical record, Patient.  History limitation: None.    Patient's pain is completely resolved    Reports that he has a calcification on his kidney that has been watched by Dr. Johnson for some time.     History of CAD s/p CABG, DVT (on  xarelto), HTN, HLD, DM2, Burkitt's lymphoma, Insomnia.     Social history- quit smoking 15 years ago, no EtOH, or illicit drug use.     Surgical history- CABGx3,PCI, Appendectomy, Colectomy       No Known Allergies    Current Outpatient Medications   Medication Sig Dispense Refill    aspirin 81 mg capsule Take by mouth.      cholecalciferol (Vitamin D3) 10 MCG (400 UNIT) tablet Take by mouth once daily.      fenofibrate (Tricor) 145 mg tablet Take 1 tablet (145 mg) by mouth once daily.      folic acid/multivit-min/lutein (CENTRUM SILVER ORAL)       gabapentin (Neurontin) 300 mg capsule Take 1 capsule (300 mg) by mouth once daily.      lisinopril 20 mg tablet Take 1 tablet (20 mg) by mouth once daily.      MAGNESIUM ORAL Take by mouth.      MELATONIN ORAL Take by mouth.      metoprolol succinate XL (Toprol-XL) 50 mg 24 hr tablet Take 1 tablet (50 mg) by mouth once daily. Do not crush or chew.      multivitamin with iron-minerals 9 mg iron/15 mL liquid Take by mouth once daily.      nitroglycerin (Nitrostat) 0.4 mg SL tablet Place 1 tablet (0.4 mg) under the tongue.      pantoprazole (ProtoNix) 40 mg EC tablet Take 1 tablet (40 mg) by mouth once daily in the morning. Take before meals. Do not crush, chew, or split.      simvastatin (Zocor) 20 mg tablet Take 1 tablet (20 mg) by mouth once daily at bedtime.      spironolacton-hydrochlorothiaz (Aldactazide) 25-25 mg tablet Take 1 tablet (25 mg) by mouth.      tamsulosin (Flomax) 0.4 mg 24 hr capsule TAKE 1 CAPSULE BY MOUTH EVERY DAY 90 capsule 1    traMADol (Ultram) 50 mg tablet Take by mouth every 6 hours if needed for severe pain (7 - 10).      traZODone (Desyrel) 100 mg tablet Take 1 tablet (100 mg) by mouth as needed at bedtime for sleep. 30 tablet 0    Xarelto 20 mg tablet 1 tablet (20 mg).      zolpidem (Ambien) 5 mg tablet 1 tablet (5 mg) as needed at bedtime.       No current facility-administered medications for this visit.           Review of Systems    Musculoskeletal:  Positive for back pain.      Comprehensive review of systems as allowed by patient condition and nursing input is negative        Radiology: Reviewed imaging in powerchart.  No results found.    No family history on file.  Social History     Socioeconomic History    Marital status:      Spouse name: Not on file    Number of children: Not on file    Years of education: Not on file    Highest education level: Not on file   Occupational History    Not on file   Tobacco Use    Smoking status: Some Days     Types: Cigarettes    Smokeless tobacco: Never   Substance and Sexual Activity    Alcohol use: Not Currently    Drug use: Not on file    Sexual activity: Not on file   Other Topics Concern    Not on file   Social History Narrative    Not on file     Social Determinants of Health     Financial Resource Strain: Not on file   Food Insecurity: Not on file   Transportation Needs: Not on file   Physical Activity: Not on file   Stress: Not on file   Social Connections: Not on file   Intimate Partner Violence: Not on file   Housing Stability: Not on file     Past Medical History:   Diagnosis Date    Diverticulosis of intestine, part unspecified, without perforation or abscess without bleeding     Diverticulosis    Other hemorrhoids     Internal hemorrhoids    Personal history of colonic polyps     History of colonic polyps    Personal history of other diseases of the respiratory system     History of sinusitis    Personal history of other venous thrombosis and embolism     History of deep venous thrombosis    Personal history of pulmonary embolism     Personal history of pulmonary embolism     Past Surgical History:   Procedure Laterality Date    CHOLECYSTECTOMY  06/23/2014    Cholecystectomy    TONSILLECTOMY  06/23/2014    Tonsillectomy       Scribe Attestation  By signing my name below, Tanya MANJARREZ Scribe   attest that this documentation has been prepared under the direction and in the  presence of Isidro Fry MD.

## 2023-10-01 DIAGNOSIS — G47.00 INSOMNIA, UNSPECIFIED TYPE: ICD-10-CM

## 2023-10-03 RX ORDER — TRAZODONE HYDROCHLORIDE 100 MG/1
100 TABLET ORAL NIGHTLY PRN
Qty: 30 TABLET | Refills: 0 | Status: SHIPPED | OUTPATIENT
Start: 2023-10-03 | End: 2023-10-06 | Stop reason: SDUPTHER

## 2023-10-06 DIAGNOSIS — G47.00 INSOMNIA, UNSPECIFIED TYPE: ICD-10-CM

## 2023-10-09 RX ORDER — TRAZODONE HYDROCHLORIDE 100 MG/1
100 TABLET ORAL NIGHTLY PRN
Qty: 30 TABLET | Refills: 1 | Status: SHIPPED | OUTPATIENT
Start: 2023-10-09 | End: 2023-11-28

## 2023-10-30 ENCOUNTER — APPOINTMENT (OUTPATIENT)
Dept: PRIMARY CARE | Facility: CLINIC | Age: 83
End: 2023-10-30
Payer: MEDICARE

## 2023-11-28 DIAGNOSIS — G47.00 INSOMNIA, UNSPECIFIED TYPE: ICD-10-CM

## 2023-11-28 RX ORDER — TRAZODONE HYDROCHLORIDE 100 MG/1
100 TABLET ORAL NIGHTLY PRN
Qty: 90 TABLET | Refills: 1 | Status: SHIPPED | OUTPATIENT
Start: 2023-11-28

## 2023-12-13 ENCOUNTER — OFFICE VISIT (OUTPATIENT)
Dept: PRIMARY CARE | Facility: CLINIC | Age: 83
End: 2023-12-13
Payer: MEDICARE

## 2023-12-13 VITALS
HEIGHT: 68 IN | SYSTOLIC BLOOD PRESSURE: 118 MMHG | BODY MASS INDEX: 23.7 KG/M2 | DIASTOLIC BLOOD PRESSURE: 62 MMHG | HEART RATE: 86 BPM | WEIGHT: 156.4 LBS | OXYGEN SATURATION: 96 %

## 2023-12-13 DIAGNOSIS — F51.04 CHRONIC INSOMNIA: ICD-10-CM

## 2023-12-13 DIAGNOSIS — E13.69 OTHER SPECIFIED DIABETES MELLITUS WITH OTHER SPECIFIED COMPLICATION, UNSPECIFIED WHETHER LONG TERM INSULIN USE (MULTI): ICD-10-CM

## 2023-12-13 DIAGNOSIS — I10 BENIGN ESSENTIAL HYPERTENSION: ICD-10-CM

## 2023-12-13 DIAGNOSIS — R60.9 EDEMA, UNSPECIFIED TYPE: Primary | ICD-10-CM

## 2023-12-13 DIAGNOSIS — I25.810 CORONARY ARTERY DISEASE INVOLVING CORONARY BYPASS GRAFT OF NATIVE HEART WITHOUT ANGINA PECTORIS: ICD-10-CM

## 2023-12-13 DIAGNOSIS — E11.69 COMBINED HYPERLIPIDEMIA ASSOCIATED WITH TYPE 2 DIABETES MELLITUS (MULTI): ICD-10-CM

## 2023-12-13 DIAGNOSIS — E78.2 COMBINED HYPERLIPIDEMIA ASSOCIATED WITH TYPE 2 DIABETES MELLITUS (MULTI): ICD-10-CM

## 2023-12-13 DIAGNOSIS — F32.A DEPRESSION, UNSPECIFIED DEPRESSION TYPE: ICD-10-CM

## 2023-12-13 DIAGNOSIS — N40.0 BENIGN PROSTATIC HYPERPLASIA, UNSPECIFIED WHETHER LOWER URINARY TRACT SYMPTOMS PRESENT: ICD-10-CM

## 2023-12-13 LAB — POC HEMOGLOBIN A1C: 6.1 % (ref 4.2–6.5)

## 2023-12-13 PROCEDURE — 99214 OFFICE O/P EST MOD 30 MIN: CPT | Performed by: EMERGENCY MEDICINE

## 2023-12-13 PROCEDURE — 1159F MED LIST DOCD IN RCRD: CPT | Performed by: EMERGENCY MEDICINE

## 2023-12-13 PROCEDURE — 3074F SYST BP LT 130 MM HG: CPT | Performed by: EMERGENCY MEDICINE

## 2023-12-13 PROCEDURE — 3078F DIAST BP <80 MM HG: CPT | Performed by: EMERGENCY MEDICINE

## 2023-12-13 PROCEDURE — 83036 HEMOGLOBIN GLYCOSYLATED A1C: CPT | Performed by: EMERGENCY MEDICINE

## 2023-12-13 RX ORDER — SPIRONOLACTONE 50 MG/1
50 TABLET, FILM COATED ORAL DAILY
Qty: 7 TABLET | Refills: 0 | Status: SHIPPED | OUTPATIENT
Start: 2023-12-13 | End: 2024-02-16

## 2023-12-13 RX ORDER — ESCITALOPRAM OXALATE 10 MG/1
10 TABLET ORAL DAILY
Qty: 30 TABLET | Refills: 5 | Status: SHIPPED | OUTPATIENT
Start: 2023-12-13 | End: 2024-04-12

## 2023-12-13 RX ORDER — TORSEMIDE 20 MG/1
20 TABLET ORAL DAILY
Qty: 7 TABLET | Refills: 0 | Status: SHIPPED | OUTPATIENT
Start: 2023-12-13 | End: 2023-12-20

## 2023-12-13 NOTE — PROGRESS NOTES
Subjective   Patient ID: Moshe Velázquez is a 83 y.o. male who presents for Insomnia.    Assessment/Plan   Problem List Items Addressed This Visit       CAD (coronary artery disease)    Benign essential hypertension    BPH (benign prostatic hyperplasia)    Combined hyperlipidemia associated with type 2 diabetes mellitus (CMS/HCC)    Chronic insomnia     Other Visit Diagnoses       Edema, unspecified type    -  Primary    Relevant Medications    torsemide (Demadex) 20 mg tablet    spironolactone (Aldactone) 50 mg tablet    Other specified diabetes mellitus with other specified complication, unspecified whether long term insulin use (CMS/[facility])        Relevant Orders    POCT glycosylated hemoglobin (Hb A1C) manually resulted (Completed)    Depression, unspecified depression type        Relevant Medications    escitalopram (Lexapro) 10 mg tablet          Insomnia- increase Trazodone to 200mg.     Peripheral edema/shortness of breath- short course of torsemide and aldactone.   Counseled on fluid and salt intake.     Unintentionally weight loss- counseled to follow up with oncology  His affect is suggestive of depression, will start on lexapro and monitor     BPH- on Flomax     CAD- s/p CABG August 2018. Follows with Dr. Jefferson     Diabetes Type 2- Stable. Diet controlled. HGB A1c 6.1 today.      History of Burkitt's lymphoma/leukemia- Stable. Following with Dr. Johnson      DVT- on Xarelto     Hypertension- on Lisinopril 20mg, Norvasc 5 mg and Aldactone-HCTZ 25-25mg daily.      Hyperlipidemia- continue Simvastatin and Fenofibrate    Follow up in 3 months or sooner as needed     Source of history: Nurse, Medical personnel, Medical record, Patient.  History limitation: None.    HPI  83 y.o. male here for follow up visit     Present with insomnia. Taking trazodone each night. Sleeps for about 3 hours before waking up each night. A few days ago was unable to fall asleep and went 44 hours without sleeping.     Has been smoking  again for the last 2 months secondary to stress. Has noticed increased shortness of breath. Lives in a second floor apartment and has some difficulty going up the stairs.   Legs peripherally swollen bilaterally.     Significant weight loss over the past year unintentionally. Eating less for the past year, decreased appetite.   Denies feeling depressed, however his affect and language is suggestive of it.   History of non-hodgkin's lymphoma. Follows up with oncology q6 months.      Reports that he has a calcification on his kidney that has been watched by Dr. Johnson for some time.      History of CAD s/p CABG, DVT (on xarelto), HTN, HLD, DM2, Burkitt's lymphoma, Insomnia.      Social history-  no EtOH, or illicit drug use.   Did not smoke for 15 years but recently start again.      Surgical history- CABGx3,PCI, Appendectomy, Colectomy     No Known Allergies    Current Outpatient Medications   Medication Sig Dispense Refill    aspirin 81 mg capsule Take by mouth.      cholecalciferol (Vitamin D3) 10 MCG (400 UNIT) tablet Take by mouth once daily.      fenofibrate (Tricor) 145 mg tablet Take 1 tablet (145 mg) by mouth once daily.      folic acid/multivit-min/lutein (CENTRUM SILVER ORAL)       gabapentin (Neurontin) 300 mg capsule Take 1 capsule (300 mg) by mouth once daily.      lisinopril 20 mg tablet Take 1 tablet (20 mg) by mouth once daily.      MAGNESIUM ORAL Take by mouth.      MELATONIN ORAL Take by mouth.      metoprolol succinate XL (Toprol-XL) 50 mg 24 hr tablet Take 1 tablet (50 mg) by mouth once daily. Do not crush or chew.      multivitamin with iron-minerals 9 mg iron/15 mL liquid Take by mouth once daily.      nitroglycerin (Nitrostat) 0.4 mg SL tablet Place 1 tablet (0.4 mg) under the tongue.      pantoprazole (ProtoNix) 40 mg EC tablet Take 1 tablet (40 mg) by mouth once daily in the morning. Take before meals. Do not crush, chew, or split.      simvastatin (Zocor) 20 mg tablet Take 1 tablet (20 mg)  "by mouth once daily at bedtime.      spironolacton-hydrochlorothiaz (Aldactazide) 25-25 mg tablet Take 1 tablet (25 mg) by mouth.      tamsulosin (Flomax) 0.4 mg 24 hr capsule TAKE 1 CAPSULE BY MOUTH EVERY DAY 90 capsule 1    traMADol (Ultram) 50 mg tablet Take by mouth every 6 hours if needed for severe pain (7 - 10).      traZODone (Desyrel) 100 mg tablet Take 1 tablet (100 mg) by mouth as needed at bedtime for sleep. 90 tablet 1    Xarelto 20 mg tablet 1 tablet (20 mg).      zolpidem (Ambien) 5 mg tablet 1 tablet (5 mg) as needed at bedtime.      escitalopram (Lexapro) 10 mg tablet Take 1 tablet (10 mg) by mouth once daily. 30 tablet 5    spironolactone (Aldactone) 50 mg tablet Take 1 tablet (50 mg) by mouth once daily for 7 days. 7 tablet 0    torsemide (Demadex) 20 mg tablet Take 1 tablet (20 mg) by mouth once daily for 7 days. 7 tablet 0     No current facility-administered medications for this visit.       Objective   Visit Vitals  /62   Pulse 86   Ht 1.727 m (5' 8\")   Wt 70.9 kg (156 lb 6.4 oz)   SpO2 96%   BMI 23.78 kg/m²   Smoking Status Some Days   BSA 1.84 m²     Physical Exam  Vital signs as per nursing/MA documentation   General appearance: Alert and in no acute distress  HEENT: Normal Inspection   Neck: Normal Inspection   Respiratory: No respiratory distress Lungs are clear   Cardiovascular: Heart rate normal. No gallop  Back: Normal Inspection   Skin inspection: Warm   Musculoskeletal: No deformities   Neuro: Limited exam. Baseline    Review of Systems  Comprehensive review of systems as allowed by patient condition and nursing input is negative    Office Visit on 12/13/2023   Component Date Value Ref Range Status    POC HEMOGLOBIN A1c 12/13/2023 6.1  4.2 - 6.5 % Final       Radiology: Reviewed imaging in powerchart.  No results found.    No family history on file.  Social History     Socioeconomic History    Marital status:      Spouse name: None    Number of children: None    Years " of education: None    Highest education level: None   Occupational History    None   Tobacco Use    Smoking status: Some Days     Types: Cigarettes    Smokeless tobacco: Never   Substance and Sexual Activity    Alcohol use: Not Currently    Drug use: None    Sexual activity: None   Other Topics Concern    None   Social History Narrative    None     Social Determinants of Health     Financial Resource Strain: Not on file   Food Insecurity: Not on file   Transportation Needs: Not on file   Physical Activity: Not on file   Stress: Not on file   Social Connections: Not on file   Intimate Partner Violence: Not on file   Housing Stability: Not on file     Past Medical History:   Diagnosis Date    Diverticulosis of intestine, part unspecified, without perforation or abscess without bleeding     Diverticulosis    Other hemorrhoids     Internal hemorrhoids    Personal history of colonic polyps     History of colonic polyps    Personal history of other diseases of the respiratory system     History of sinusitis    Personal history of other venous thrombosis and embolism     History of deep venous thrombosis    Personal history of pulmonary embolism     Personal history of pulmonary embolism     Past Surgical History:   Procedure Laterality Date    CHOLECYSTECTOMY  06/23/2014    Cholecystectomy    TONSILLECTOMY  06/23/2014    Tonsillectomy       Neena Attestation  By signing my name below, ITanya Scribe   attest that this documentation has been prepared under the direction and in the presence of Isidro Fry MD.

## 2024-01-11 ENCOUNTER — OFFICE VISIT (OUTPATIENT)
Dept: PRIMARY CARE | Facility: CLINIC | Age: 84
End: 2024-01-11
Payer: MEDICARE

## 2024-01-11 VITALS
BODY MASS INDEX: 20.61 KG/M2 | SYSTOLIC BLOOD PRESSURE: 93 MMHG | WEIGHT: 136 LBS | HEIGHT: 68 IN | HEART RATE: 101 BPM | TEMPERATURE: 97.8 F | DIASTOLIC BLOOD PRESSURE: 60 MMHG | OXYGEN SATURATION: 91 %

## 2024-01-11 DIAGNOSIS — R05.8 UPPER AIRWAY COUGH SYNDROME: ICD-10-CM

## 2024-01-11 DIAGNOSIS — J01.90 ACUTE SINUSITIS, RECURRENCE NOT SPECIFIED, UNSPECIFIED LOCATION: Primary | ICD-10-CM

## 2024-01-11 DIAGNOSIS — I95.9 HYPOTENSION, UNSPECIFIED HYPOTENSION TYPE: ICD-10-CM

## 2024-01-11 PROCEDURE — 3078F DIAST BP <80 MM HG: CPT | Performed by: STUDENT IN AN ORGANIZED HEALTH CARE EDUCATION/TRAINING PROGRAM

## 2024-01-11 PROCEDURE — 99213 OFFICE O/P EST LOW 20 MIN: CPT | Performed by: STUDENT IN AN ORGANIZED HEALTH CARE EDUCATION/TRAINING PROGRAM

## 2024-01-11 PROCEDURE — 3074F SYST BP LT 130 MM HG: CPT | Performed by: STUDENT IN AN ORGANIZED HEALTH CARE EDUCATION/TRAINING PROGRAM

## 2024-01-11 PROCEDURE — 1159F MED LIST DOCD IN RCRD: CPT | Performed by: STUDENT IN AN ORGANIZED HEALTH CARE EDUCATION/TRAINING PROGRAM

## 2024-01-11 RX ORDER — DOXYCYCLINE 100 MG/1
100 CAPSULE ORAL 2 TIMES DAILY
Qty: 14 CAPSULE | Refills: 0 | Status: SHIPPED | OUTPATIENT
Start: 2024-01-11 | End: 2024-01-18

## 2024-01-11 RX ORDER — METHYLPREDNISOLONE 4 MG/1
TABLET ORAL
Qty: 21 TABLET | Refills: 0 | Status: SHIPPED | OUTPATIENT
Start: 2024-01-11 | End: 2024-01-18

## 2024-01-11 RX ORDER — BROMPHENIRAMINE MALEATE, PSEUDOEPHEDRINE HYDROCHLORIDE, AND DEXTROMETHORPHAN HYDROBROMIDE 2; 30; 10 MG/5ML; MG/5ML; MG/5ML
5 SYRUP ORAL 3 TIMES DAILY PRN
Qty: 120 ML | Refills: 0 | Status: SHIPPED | OUTPATIENT
Start: 2024-01-11 | End: 2024-01-21

## 2024-01-11 RX ORDER — FLUTICASONE PROPIONATE 50 MCG
1 SPRAY, SUSPENSION (ML) NASAL DAILY
Qty: 16 G | Refills: 11 | Status: SHIPPED | OUTPATIENT
Start: 2024-01-11 | End: 2025-01-10

## 2024-01-11 NOTE — PROGRESS NOTES
"Subjective   Patient ID: Moshe Velázquez is a 83 y.o. male who presents for the following    Assessment/Plan   #Acute URI  #Acute Sinusitis  #UACS    PLAN  -Medrol dose pack  -Doxycycline 100mg PO BID  -Flonase BID   -BP/PE/DM cough syrup rx     #Soft BP  -States his BP is always lower  -He is asymptomatic at this time  -Advised to check BP at home and talk to PCP about stopping torsemide if BP gets too low.       HPI  83M presents for acute sick visit. Has a hx of LLE edema, lymphoma, CAD, DM, BPH, HTN.     He has had about 1 month of sinus pressure, sinus drainage, cough. Denies any fevers, chills, CP. States that he has taking a COVID test and it has been negative. Otherwise no symptoms at this time.     Denies fevers, chills, weight loss, lightheadedness, dizziness, vision changes,  CP, SOB, palpitations, n/v/d, abd pain, black/bloody stools, arthralgias, or new numbness/weakness/tingling in arms/legs/face.      Social Hx  T: occasional  A: denies  D: denies       Visit Vitals  BP 93/60   Pulse 101   Temp 36.6 °C (97.8 °F)   Ht 1.727 m (5' 8\")   Wt 61.7 kg (136 lb)   SpO2 91%   BMI 20.68 kg/m²   Smoking Status Some Days   BSA 1.72 m²     PHYSICAL EXAM   Physical Exam     Visit Vitals  BP 93/60   Pulse 101   Temp 36.6 °C (97.8 °F)   Ht 1.727 m (5' 8\")   Wt 61.7 kg (136 lb)   SpO2 91%   BMI 20.68 kg/m²   Smoking Status Some Days   BSA 1.72 m²        General: NAD. NCAT. Aox3   HEENT: PERRLA. EOMI. MMM. Nares patent bl. Posterior pharyngeal erythema. No exudate. No CLAD.   Cardiovascular: RRR. No MRG. S1/S2 wnl.   Respiratory: CTABL. No acute respiratory distress.   GI: Soft, NT abdomen.   MSK: ROM x 4.   Extremities: Trace edema BLLE. Cap refill < 2 sec.   Neuro: Aox3. Cranial Nerves grossly intact. Motor/sensory wnl.   Psych: Mood wnl.      REVIEW OF SYSTEMS     ROS in HPI   No Known Allergies    Current Outpatient Medications   Medication Sig Dispense Refill    aspirin 81 mg capsule Take by mouth.      " cholecalciferol (Vitamin D3) 10 MCG (400 UNIT) tablet Take by mouth once daily.      escitalopram (Lexapro) 10 mg tablet Take 1 tablet (10 mg) by mouth once daily. 30 tablet 5    fenofibrate (Tricor) 145 mg tablet Take 1 tablet (145 mg) by mouth once daily.      folic acid/multivit-min/lutein (CENTRUM SILVER ORAL)       gabapentin (Neurontin) 300 mg capsule Take 1 capsule (300 mg) by mouth once daily.      lisinopril 20 mg tablet Take 1 tablet (20 mg) by mouth once daily.      MAGNESIUM ORAL Take by mouth.      MELATONIN ORAL Take by mouth.      metoprolol succinate XL (Toprol-XL) 50 mg 24 hr tablet Take 1 tablet (50 mg) by mouth once daily. Do not crush or chew.      multivitamin with iron-minerals 9 mg iron/15 mL liquid Take by mouth once daily.      nitroglycerin (Nitrostat) 0.4 mg SL tablet Place 1 tablet (0.4 mg) under the tongue.      pantoprazole (ProtoNix) 40 mg EC tablet Take 1 tablet (40 mg) by mouth once daily in the morning. Take before meals. Do not crush, chew, or split.      simvastatin (Zocor) 20 mg tablet Take 1 tablet (20 mg) by mouth once daily at bedtime.      spironolacton-hydrochlorothiaz (Aldactazide) 25-25 mg tablet Take 1 tablet (25 mg) by mouth.      tamsulosin (Flomax) 0.4 mg 24 hr capsule TAKE 1 CAPSULE BY MOUTH EVERY DAY 90 capsule 1    traMADol (Ultram) 50 mg tablet Take by mouth every 6 hours if needed for severe pain (7 - 10).      traZODone (Desyrel) 100 mg tablet Take 1 tablet (100 mg) by mouth as needed at bedtime for sleep. 90 tablet 1    Xarelto 20 mg tablet 1 tablet (20 mg).      zolpidem (Ambien) 5 mg tablet 1 tablet (5 mg) as needed at bedtime.      spironolactone (Aldactone) 50 mg tablet Take 1 tablet (50 mg) by mouth once daily for 7 days. 7 tablet 0    torsemide (Demadex) 20 mg tablet Take 1 tablet (20 mg) by mouth once daily for 7 days. 7 tablet 0     No current facility-administered medications for this visit.       Objective     Office Visit on 12/13/2023   Component  Date Value Ref Range Status    POC HEMOGLOBIN A1c 12/13/2023 6.1  4.2 - 6.5 % Final       Radiology: Reviewed imaging in powerchart.  No results found.    No family history on file.  Social History     Socioeconomic History    Marital status:      Spouse name: None    Number of children: None    Years of education: None    Highest education level: None   Occupational History    None   Tobacco Use    Smoking status: Some Days     Types: Cigarettes    Smokeless tobacco: Never   Substance and Sexual Activity    Alcohol use: Not Currently    Drug use: None    Sexual activity: None   Other Topics Concern    None   Social History Narrative    None     Social Determinants of Health     Financial Resource Strain: Not on file   Food Insecurity: Not on file   Transportation Needs: Not on file   Physical Activity: Not on file   Stress: Not on file   Social Connections: Not on file   Intimate Partner Violence: Not on file   Housing Stability: Not on file     Past Medical History:   Diagnosis Date    Diverticulosis of intestine, part unspecified, without perforation or abscess without bleeding     Diverticulosis    Other hemorrhoids     Internal hemorrhoids    Personal history of colonic polyps     History of colonic polyps    Personal history of other diseases of the respiratory system     History of sinusitis    Personal history of other venous thrombosis and embolism     History of deep venous thrombosis    Personal history of pulmonary embolism     Personal history of pulmonary embolism     Past Surgical History:   Procedure Laterality Date    CHOLECYSTECTOMY  06/23/2014    Cholecystectomy    TONSILLECTOMY  06/23/2014    Tonsillectomy       Charting was completed using voice recognition technology and may include unintended errors.

## 2024-01-16 ENCOUNTER — TELEPHONE (OUTPATIENT)
Dept: PRIMARY CARE | Facility: CLINIC | Age: 84
End: 2024-01-16
Payer: MEDICARE

## 2024-01-16 NOTE — TELEPHONE ENCOUNTER
PATIENT WANTS  TO KNOW THAT HE HAD HIS ENDOSCOPY LAST WEEK WITH DR ADAMES AND THAT HE IS STILL LOSING WEIGHT. HE DECLINED TO SET UP AN APT BUT IS ASKING FOR A CALL BACK FROM DR MULLER  HE IS AWARE THAT  IS OUT OF OFFICE TODAY AND WILL SEE THIS MESSAGE TOMORROW AFTERNOON

## 2024-01-24 ENCOUNTER — OFFICE VISIT (OUTPATIENT)
Dept: PRIMARY CARE | Facility: CLINIC | Age: 84
End: 2024-01-24
Payer: MEDICARE

## 2024-01-24 VITALS
SYSTOLIC BLOOD PRESSURE: 101 MMHG | HEART RATE: 92 BPM | DIASTOLIC BLOOD PRESSURE: 54 MMHG | BODY MASS INDEX: 18.64 KG/M2 | OXYGEN SATURATION: 95 % | HEIGHT: 68 IN | WEIGHT: 123 LBS

## 2024-01-24 DIAGNOSIS — R53.1 WEAKNESS: ICD-10-CM

## 2024-01-24 DIAGNOSIS — R63.4 UNINTENTIONAL WEIGHT LOSS: Primary | ICD-10-CM

## 2024-01-24 PROCEDURE — 3074F SYST BP LT 130 MM HG: CPT | Performed by: EMERGENCY MEDICINE

## 2024-01-24 PROCEDURE — 1157F ADVNC CARE PLAN IN RCRD: CPT | Performed by: EMERGENCY MEDICINE

## 2024-01-24 PROCEDURE — 3078F DIAST BP <80 MM HG: CPT | Performed by: EMERGENCY MEDICINE

## 2024-01-24 PROCEDURE — 99214 OFFICE O/P EST MOD 30 MIN: CPT | Performed by: EMERGENCY MEDICINE

## 2024-01-24 PROCEDURE — 1159F MED LIST DOCD IN RCRD: CPT | Performed by: EMERGENCY MEDICINE

## 2024-01-24 NOTE — PROGRESS NOTES
Subjective   Patient ID: Moshe Velázquez is a 83 y.o. male who presents for Follow-up.    Assessment/Plan   Problem List Items Addressed This Visit    None  Visit Diagnoses       Unintentional weight loss    -  Primary    Weakness              Unintentionally weight loss and weakness- patient directed to ER for work up and IV fluids.     Insomnia- Trazodone 200mg.      BPH- on Flomax     CAD- s/p CABG August 2018. Follows with Dr. Jefferson     Diabetes Type 2- Stable. Diet controlled. Last HGB A1c 6.1     History of Burkitt's lymphoma/leukemia- Stable. Following with Dr. Johnson      DVT- on Xarelto     Hypertension- on Lisinopril 20mg, Norvasc 5 mg and Aldactone-HCTZ 25-25mg daily.      Hyperlipidemia- continue Simvastatin and Fenofibrate     Follow up in 3 months or sooner as needed    Source of history: Nurse, Medical personnel, Medical record, Patient.  History limitation: None.    HPI  83 y.o. male here for problem visit     Patient presents with his daughter with concern of unintentional weight loss.   Has not been eating or drinking well for what his daughter believes months.   Patient states that he has had no appetite.   Previously was on diuretics and also had diarrhea last week. Has drank very little fluids.     Weights:   12/13/23- 156lbs   1/11/24- 136lbs   Today- 123lbs     Also notes shooting pain in lower right quadrant with coughing. Pain not reproducible with palpation.     No Known Allergies    Current Outpatient Medications   Medication Sig Dispense Refill    aspirin 81 mg capsule Take by mouth.      cholecalciferol (Vitamin D3) 10 MCG (400 UNIT) tablet Take by mouth once daily.      escitalopram (Lexapro) 10 mg tablet Take 1 tablet (10 mg) by mouth once daily. 30 tablet 5    fenofibrate (Tricor) 145 mg tablet Take 1 tablet (145 mg) by mouth once daily.      fluticasone (Flonase) 50 mcg/actuation nasal spray Administer 1 spray into each nostril once daily. Shake gently. Before first use, prime pump.  "After use, clean tip and replace cap. 16 g 11    folic acid/multivit-min/lutein (CENTRUM SILVER ORAL)       gabapentin (Neurontin) 300 mg capsule Take 1 capsule (300 mg) by mouth once daily.      lisinopril 20 mg tablet Take 1 tablet (20 mg) by mouth once daily.      MAGNESIUM ORAL Take by mouth.      MELATONIN ORAL Take by mouth.      metoprolol succinate XL (Toprol-XL) 50 mg 24 hr tablet Take 1 tablet (50 mg) by mouth once daily. Do not crush or chew.      multivitamin with iron-minerals 9 mg iron/15 mL liquid Take by mouth once daily.      nitroglycerin (Nitrostat) 0.4 mg SL tablet Place 1 tablet (0.4 mg) under the tongue.      pantoprazole (ProtoNix) 40 mg EC tablet Take 1 tablet (40 mg) by mouth once daily in the morning. Take before meals. Do not crush, chew, or split.      simvastatin (Zocor) 20 mg tablet Take 1 tablet (20 mg) by mouth once daily at bedtime.      spironolacton-hydrochlorothiaz (Aldactazide) 25-25 mg tablet Take 1 tablet (25 mg) by mouth.      tamsulosin (Flomax) 0.4 mg 24 hr capsule TAKE 1 CAPSULE BY MOUTH EVERY DAY 90 capsule 1    traMADol (Ultram) 50 mg tablet Take by mouth every 6 hours if needed for severe pain (7 - 10).      traZODone (Desyrel) 100 mg tablet Take 1 tablet (100 mg) by mouth as needed at bedtime for sleep. 90 tablet 1    Xarelto 20 mg tablet 1 tablet (20 mg).      zolpidem (Ambien) 5 mg tablet 1 tablet (5 mg) as needed at bedtime.      spironolactone (Aldactone) 50 mg tablet Take 1 tablet (50 mg) by mouth once daily for 7 days. 7 tablet 0    torsemide (Demadex) 20 mg tablet Take 1 tablet (20 mg) by mouth once daily for 7 days. 7 tablet 0     No current facility-administered medications for this visit.       Objective   Visit Vitals  /54   Pulse 92   Ht 1.727 m (5' 8\")   Wt 55.8 kg (123 lb)   SpO2 95%   BMI 18.70 kg/m²   Smoking Status Some Days   BSA 1.64 m²     Physical Exam  Vital signs as per nursing/MA documentation   General appearance: Alert and in no acute " distress  HEENT: Normal Inspection   Neck: Normal Inspection   Respiratory: No respiratory distress Lungs are clear   Cardiovascular: Heart rate normal. No gallop  Back: Normal Inspection   Skin inspection: Warm   Musculoskeletal: No deformities   Neuro: Limited exam. Baseline    Review of Systems   Comprehensive review of systems as allowed by patient condition and nursing input is negative    Office Visit on 12/13/2023   Component Date Value Ref Range Status    POC HEMOGLOBIN A1c 12/13/2023 6.1  4.2 - 6.5 % Final       Radiology: Reviewed imaging in powerchart.  No results found.    No family history on file.  Social History     Socioeconomic History    Marital status:      Spouse name: None    Number of children: None    Years of education: None    Highest education level: None   Occupational History    None   Tobacco Use    Smoking status: Some Days     Types: Cigarettes    Smokeless tobacco: Never   Substance and Sexual Activity    Alcohol use: Not Currently    Drug use: None    Sexual activity: None   Other Topics Concern    None   Social History Narrative    None     Social Determinants of Health     Financial Resource Strain: Not on file   Food Insecurity: Not on file   Transportation Needs: Not on file   Physical Activity: Not on file   Stress: Not on file   Social Connections: Not on file   Intimate Partner Violence: Not on file   Housing Stability: Not on file     Past Medical History:   Diagnosis Date    Diverticulosis of intestine, part unspecified, without perforation or abscess without bleeding     Diverticulosis    Other hemorrhoids     Internal hemorrhoids    Personal history of colonic polyps     History of colonic polyps    Personal history of other diseases of the respiratory system     History of sinusitis    Personal history of other venous thrombosis and embolism     History of deep venous thrombosis    Personal history of pulmonary embolism     Personal history of pulmonary embolism      Past Surgical History:   Procedure Laterality Date    CHOLECYSTECTOMY  06/23/2014    Cholecystectomy    TONSILLECTOMY  06/23/2014    Tonsillectomy       Scribe Attestation  By signing my name below, I, Neena Sierra   attest that this documentation has been prepared under the direction and in the presence of Isidro Fry MD.

## 2024-02-15 ENCOUNTER — APPOINTMENT (OUTPATIENT)
Dept: RADIOLOGY | Facility: HOSPITAL | Age: 84
DRG: 682 | End: 2024-02-15
Payer: MEDICARE

## 2024-02-15 ENCOUNTER — HOSPITAL ENCOUNTER (INPATIENT)
Facility: HOSPITAL | Age: 84
LOS: 5 days | Discharge: NURSING, REHAB OR LONG-TERM CARE | DRG: 682 | End: 2024-02-21
Attending: STUDENT IN AN ORGANIZED HEALTH CARE EDUCATION/TRAINING PROGRAM | Admitting: NURSE PRACTITIONER
Payer: MEDICARE

## 2024-02-15 DIAGNOSIS — G93.41 ACUTE METABOLIC ENCEPHALOPATHY: Primary | ICD-10-CM

## 2024-02-15 DIAGNOSIS — J18.9 PNEUMONIA DUE TO INFECTIOUS ORGANISM, UNSPECIFIED LATERALITY, UNSPECIFIED PART OF LUNG: ICD-10-CM

## 2024-02-15 DIAGNOSIS — R41.82 ALTERED MENTAL STATUS, UNSPECIFIED ALTERED MENTAL STATUS TYPE: ICD-10-CM

## 2024-02-15 DIAGNOSIS — R79.89 ELEVATED TROPONIN: ICD-10-CM

## 2024-02-15 DIAGNOSIS — E87.79 OTHER FLUID OVERLOAD: ICD-10-CM

## 2024-02-15 DIAGNOSIS — N17.9 AKI (ACUTE KIDNEY INJURY) (CMS-HCC): ICD-10-CM

## 2024-02-15 LAB
APTT PPP: 36 SECONDS (ref 27–38)
BASOPHILS # BLD AUTO: 0.01 X10*3/UL (ref 0–0.1)
BASOPHILS NFR BLD AUTO: 0.1 %
EOSINOPHIL # BLD AUTO: 0.01 X10*3/UL (ref 0–0.4)
EOSINOPHIL NFR BLD AUTO: 0.1 %
ERYTHROCYTE [DISTWIDTH] IN BLOOD BY AUTOMATED COUNT: 17 % (ref 11.5–14.5)
HCT VFR BLD AUTO: 48.2 % (ref 41–52)
HGB BLD-MCNC: 16.5 G/DL (ref 13.5–17.5)
IMM GRANULOCYTES # BLD AUTO: 0.05 X10*3/UL (ref 0–0.5)
IMM GRANULOCYTES NFR BLD AUTO: 0.6 % (ref 0–0.9)
INR PPP: 1.4 (ref 0.9–1.1)
LYMPHOCYTES # BLD AUTO: 0.25 X10*3/UL (ref 0.8–3)
LYMPHOCYTES NFR BLD AUTO: 2.8 %
MCH RBC QN AUTO: 33.2 PG (ref 26–34)
MCHC RBC AUTO-ENTMCNC: 34.2 G/DL (ref 32–36)
MCV RBC AUTO: 97 FL (ref 80–100)
MONOCYTES # BLD AUTO: 0.97 X10*3/UL (ref 0.05–0.8)
MONOCYTES NFR BLD AUTO: 10.9 %
NEUTROPHILS # BLD AUTO: 7.65 X10*3/UL (ref 1.6–5.5)
NEUTROPHILS NFR BLD AUTO: 85.5 %
NRBC BLD-RTO: 0 /100 WBCS (ref 0–0)
PLATELET # BLD AUTO: 264 X10*3/UL (ref 150–450)
PROTHROMBIN TIME: 15.6 SECONDS (ref 9.8–12.8)
RBC # BLD AUTO: 4.97 X10*6/UL (ref 4.5–5.9)
WBC # BLD AUTO: 8.9 X10*3/UL (ref 4.4–11.3)

## 2024-02-15 PROCEDURE — 87641 MR-STAPH DNA AMP PROBE: CPT | Performed by: STUDENT IN AN ORGANIZED HEALTH CARE EDUCATION/TRAINING PROGRAM

## 2024-02-15 PROCEDURE — 83880 ASSAY OF NATRIURETIC PEPTIDE: CPT | Performed by: STUDENT IN AN ORGANIZED HEALTH CARE EDUCATION/TRAINING PROGRAM

## 2024-02-15 PROCEDURE — 84484 ASSAY OF TROPONIN QUANT: CPT | Performed by: STUDENT IN AN ORGANIZED HEALTH CARE EDUCATION/TRAINING PROGRAM

## 2024-02-15 PROCEDURE — 85610 PROTHROMBIN TIME: CPT | Performed by: STUDENT IN AN ORGANIZED HEALTH CARE EDUCATION/TRAINING PROGRAM

## 2024-02-15 PROCEDURE — 71045 X-RAY EXAM CHEST 1 VIEW: CPT

## 2024-02-15 PROCEDURE — 96365 THER/PROPH/DIAG IV INF INIT: CPT

## 2024-02-15 PROCEDURE — 99223 1ST HOSP IP/OBS HIGH 75: CPT | Performed by: NURSE PRACTITIONER

## 2024-02-15 PROCEDURE — 96375 TX/PRO/DX INJ NEW DRUG ADDON: CPT

## 2024-02-15 PROCEDURE — 85730 THROMBOPLASTIN TIME PARTIAL: CPT | Performed by: STUDENT IN AN ORGANIZED HEALTH CARE EDUCATION/TRAINING PROGRAM

## 2024-02-15 PROCEDURE — 96361 HYDRATE IV INFUSION ADD-ON: CPT

## 2024-02-15 PROCEDURE — 2500000004 HC RX 250 GENERAL PHARMACY W/ HCPCS (ALT 636 FOR OP/ED): Performed by: STUDENT IN AN ORGANIZED HEALTH CARE EDUCATION/TRAINING PROGRAM

## 2024-02-15 PROCEDURE — 70450 CT HEAD/BRAIN W/O DYE: CPT

## 2024-02-15 PROCEDURE — 99285 EMERGENCY DEPT VISIT HI MDM: CPT | Mod: 25 | Performed by: STUDENT IN AN ORGANIZED HEALTH CARE EDUCATION/TRAINING PROGRAM

## 2024-02-15 PROCEDURE — 85025 COMPLETE CBC W/AUTO DIFF WBC: CPT | Performed by: STUDENT IN AN ORGANIZED HEALTH CARE EDUCATION/TRAINING PROGRAM

## 2024-02-15 PROCEDURE — 84145 PROCALCITONIN (PCT): CPT | Mod: PARLAB | Performed by: STUDENT IN AN ORGANIZED HEALTH CARE EDUCATION/TRAINING PROGRAM

## 2024-02-15 PROCEDURE — 80053 COMPREHEN METABOLIC PANEL: CPT | Performed by: STUDENT IN AN ORGANIZED HEALTH CARE EDUCATION/TRAINING PROGRAM

## 2024-02-15 PROCEDURE — 71045 X-RAY EXAM CHEST 1 VIEW: CPT | Performed by: STUDENT IN AN ORGANIZED HEALTH CARE EDUCATION/TRAINING PROGRAM

## 2024-02-15 PROCEDURE — 83605 ASSAY OF LACTIC ACID: CPT | Performed by: STUDENT IN AN ORGANIZED HEALTH CARE EDUCATION/TRAINING PROGRAM

## 2024-02-15 PROCEDURE — 87636 SARSCOV2 & INF A&B AMP PRB: CPT | Performed by: STUDENT IN AN ORGANIZED HEALTH CARE EDUCATION/TRAINING PROGRAM

## 2024-02-15 PROCEDURE — 36415 COLL VENOUS BLD VENIPUNCTURE: CPT | Performed by: STUDENT IN AN ORGANIZED HEALTH CARE EDUCATION/TRAINING PROGRAM

## 2024-02-15 PROCEDURE — 70450 CT HEAD/BRAIN W/O DYE: CPT | Performed by: STUDENT IN AN ORGANIZED HEALTH CARE EDUCATION/TRAINING PROGRAM

## 2024-02-15 RX ADMIN — SODIUM CHLORIDE 1000 ML: 9 INJECTION, SOLUTION INTRAVENOUS at 22:44

## 2024-02-15 ASSESSMENT — LIFESTYLE VARIABLES
HAVE YOU EVER FELT YOU SHOULD CUT DOWN ON YOUR DRINKING: NO
EVER FELT BAD OR GUILTY ABOUT YOUR DRINKING: NO
HAVE PEOPLE ANNOYED YOU BY CRITICIZING YOUR DRINKING: NO
EVER HAD A DRINK FIRST THING IN THE MORNING TO STEADY YOUR NERVES TO GET RID OF A HANGOVER: NO

## 2024-02-15 ASSESSMENT — PAIN SCALES - GENERAL
PAINLEVEL_OUTOF10: 0 - NO PAIN

## 2024-02-15 ASSESSMENT — COLUMBIA-SUICIDE SEVERITY RATING SCALE - C-SSRS
2. HAVE YOU ACTUALLY HAD ANY THOUGHTS OF KILLING YOURSELF?: NO
6. HAVE YOU EVER DONE ANYTHING, STARTED TO DO ANYTHING, OR PREPARED TO DO ANYTHING TO END YOUR LIFE?: NO
1. IN THE PAST MONTH, HAVE YOU WISHED YOU WERE DEAD OR WISHED YOU COULD GO TO SLEEP AND NOT WAKE UP?: NO

## 2024-02-15 ASSESSMENT — PAIN - FUNCTIONAL ASSESSMENT: PAIN_FUNCTIONAL_ASSESSMENT: 0-10

## 2024-02-16 ENCOUNTER — APPOINTMENT (OUTPATIENT)
Dept: CARDIOLOGY | Facility: HOSPITAL | Age: 84
DRG: 682 | End: 2024-02-16
Payer: MEDICARE

## 2024-02-16 PROBLEM — G93.41 ACUTE METABOLIC ENCEPHALOPATHY: Status: ACTIVE | Noted: 2024-02-16

## 2024-02-16 LAB
ALBUMIN SERPL BCP-MCNC: 3.4 G/DL (ref 3.4–5)
ALP SERPL-CCNC: 32 U/L (ref 33–136)
ALT SERPL W P-5'-P-CCNC: 31 U/L (ref 10–52)
AMMONIA PLAS-SCNC: 15 UMOL/L (ref 16–53)
ANION GAP SERPL CALC-SCNC: 15 MMOL/L (ref 10–20)
ANION GAP SERPL CALC-SCNC: 15 MMOL/L (ref 10–20)
AORTIC VALVE PEAK VELOCITY: 1.15 M/S
APPEARANCE UR: ABNORMAL
AST SERPL W P-5'-P-CCNC: 34 U/L (ref 9–39)
ATRIAL RATE: 0 BPM
AV PEAK GRADIENT: 5.3 MMHG
AVA (PEAK VEL): 1.63 CM2
BILIRUB SERPL-MCNC: 1.6 MG/DL (ref 0–1.2)
BILIRUB UR STRIP.AUTO-MCNC: NEGATIVE MG/DL
BNP SERPL-MCNC: 794 PG/ML (ref 0–99)
BUN SERPL-MCNC: 52 MG/DL (ref 6–23)
BUN SERPL-MCNC: 59 MG/DL (ref 6–23)
CALCIUM SERPL-MCNC: 10 MG/DL (ref 8.6–10.3)
CALCIUM SERPL-MCNC: 9.1 MG/DL (ref 8.6–10.3)
CARDIAC TROPONIN I PNL SERPL HS: 27 NG/L (ref 0–20)
CARDIAC TROPONIN I PNL SERPL HS: 31 NG/L (ref 0–20)
CARDIAC TROPONIN I PNL SERPL HS: 32 NG/L (ref 0–20)
CHLORIDE SERPL-SCNC: 100 MMOL/L (ref 98–107)
CHLORIDE SERPL-SCNC: 99 MMOL/L (ref 98–107)
CO2 SERPL-SCNC: 29 MMOL/L (ref 21–32)
CO2 SERPL-SCNC: 34 MMOL/L (ref 21–32)
COLOR UR: ABNORMAL
CREAT SERPL-MCNC: 1.06 MG/DL (ref 0.5–1.3)
CREAT SERPL-MCNC: 1.36 MG/DL (ref 0.5–1.3)
EGFRCR SERPLBLD CKD-EPI 2021: 52 ML/MIN/1.73M*2
EGFRCR SERPLBLD CKD-EPI 2021: 70 ML/MIN/1.73M*2
EJECTION FRACTION APICAL 4 CHAMBER: 32.6
EJECTION FRACTION: 38 %
ERYTHROCYTE [DISTWIDTH] IN BLOOD BY AUTOMATED COUNT: 17.1 % (ref 11.5–14.5)
FLUAV RNA RESP QL NAA+PROBE: NOT DETECTED
FLUBV RNA RESP QL NAA+PROBE: NOT DETECTED
GLUCOSE BLD MANUAL STRIP-MCNC: 104 MG/DL (ref 74–99)
GLUCOSE BLD MANUAL STRIP-MCNC: 105 MG/DL (ref 74–99)
GLUCOSE BLD MANUAL STRIP-MCNC: 136 MG/DL (ref 74–99)
GLUCOSE SERPL-MCNC: 109 MG/DL (ref 74–99)
GLUCOSE SERPL-MCNC: 138 MG/DL (ref 74–99)
GLUCOSE UR STRIP.AUTO-MCNC: NEGATIVE MG/DL
HCT VFR BLD AUTO: 49.1 % (ref 41–52)
HGB BLD-MCNC: 16.5 G/DL (ref 13.5–17.5)
HOLD SPECIMEN: NORMAL
HOLD SPECIMEN: NORMAL
KETONES UR STRIP.AUTO-MCNC: NEGATIVE MG/DL
LACTATE SERPL-SCNC: 1 MMOL/L (ref 0.4–2)
LEFT ATRIUM VOLUME AREA LENGTH INDEX BSA: 47.2 ML/M2
LEFT VENTRICLE INTERNAL DIMENSION DIASTOLE: 4.8 CM (ref 3.5–6)
LEFT VENTRICULAR OUTFLOW TRACT DIAMETER: 2.2 CM
LEUKOCYTE ESTERASE UR QL STRIP.AUTO: NEGATIVE
MCH RBC QN AUTO: 33.3 PG (ref 26–34)
MCHC RBC AUTO-ENTMCNC: 33.6 G/DL (ref 32–36)
MCV RBC AUTO: 99 FL (ref 80–100)
MRSA DNA SPEC QL NAA+PROBE: NOT DETECTED
NITRITE UR QL STRIP.AUTO: NEGATIVE
NRBC BLD-RTO: 0 /100 WBCS (ref 0–0)
PH UR STRIP.AUTO: 5 [PH]
PLATELET # BLD AUTO: 203 X10*3/UL (ref 150–450)
POTASSIUM SERPL-SCNC: 4.8 MMOL/L (ref 3.5–5.3)
POTASSIUM SERPL-SCNC: 5.4 MMOL/L (ref 3.5–5.3)
PR INTERVAL: 152 MS
PROCALCITONIN SERPL-MCNC: 0.1 NG/ML
PROT SERPL-MCNC: 5.6 G/DL (ref 6.4–8.2)
PROT UR STRIP.AUTO-MCNC: NEGATIVE MG/DL
Q ONSET: 251 MS
QRS COUNT: 14 BEATS
QRS DURATION: 103 MS
QT INTERVAL: 376 MS
QTC CALCULATION(BAZETT): 442 MS
QTC FREDERICIA: 419 MS
R AXIS: -6 DEGREES
RBC # BLD AUTO: 4.96 X10*6/UL (ref 4.5–5.9)
RBC # UR STRIP.AUTO: NEGATIVE /UL
RIGHT VENTRICLE FREE WALL PEAK S': 7.94 CM/S
RIGHT VENTRICLE PEAK SYSTOLIC PRESSURE: 31.4 MMHG
SARS-COV-2 RNA RESP QL NAA+PROBE: NOT DETECTED
SODIUM SERPL-SCNC: 139 MMOL/L (ref 136–145)
SODIUM SERPL-SCNC: 143 MMOL/L (ref 136–145)
SP GR UR STRIP.AUTO: 1.02
T AXIS: 152 DEGREES
T OFFSET: 439 MS
TRICUSPID ANNULAR PLANE SYSTOLIC EXCURSION: 1.2 CM
UROBILINOGEN UR STRIP.AUTO-MCNC: 2 MG/DL
VENTRICULAR RATE: 83 BPM
WBC # BLD AUTO: 8.8 X10*3/UL (ref 4.4–11.3)

## 2024-02-16 PROCEDURE — 2500000004 HC RX 250 GENERAL PHARMACY W/ HCPCS (ALT 636 FOR OP/ED): Performed by: NURSE PRACTITIONER

## 2024-02-16 PROCEDURE — 36415 COLL VENOUS BLD VENIPUNCTURE: CPT | Performed by: STUDENT IN AN ORGANIZED HEALTH CARE EDUCATION/TRAINING PROGRAM

## 2024-02-16 PROCEDURE — 85027 COMPLETE CBC AUTOMATED: CPT | Performed by: NURSE PRACTITIONER

## 2024-02-16 PROCEDURE — 84484 ASSAY OF TROPONIN QUANT: CPT | Performed by: STUDENT IN AN ORGANIZED HEALTH CARE EDUCATION/TRAINING PROGRAM

## 2024-02-16 PROCEDURE — C9113 INJ PANTOPRAZOLE SODIUM, VIA: HCPCS | Performed by: NURSE PRACTITIONER

## 2024-02-16 PROCEDURE — 87040 BLOOD CULTURE FOR BACTERIA: CPT | Mod: PARLAB | Performed by: STUDENT IN AN ORGANIZED HEALTH CARE EDUCATION/TRAINING PROGRAM

## 2024-02-16 PROCEDURE — 84484 ASSAY OF TROPONIN QUANT: CPT | Performed by: NURSE PRACTITIONER

## 2024-02-16 PROCEDURE — 2500000004 HC RX 250 GENERAL PHARMACY W/ HCPCS (ALT 636 FOR OP/ED): Performed by: STUDENT IN AN ORGANIZED HEALTH CARE EDUCATION/TRAINING PROGRAM

## 2024-02-16 PROCEDURE — 82947 ASSAY GLUCOSE BLOOD QUANT: CPT

## 2024-02-16 PROCEDURE — 93306 TTE W/DOPPLER COMPLETE: CPT | Performed by: STUDENT IN AN ORGANIZED HEALTH CARE EDUCATION/TRAINING PROGRAM

## 2024-02-16 PROCEDURE — 81003 URINALYSIS AUTO W/O SCOPE: CPT | Performed by: STUDENT IN AN ORGANIZED HEALTH CARE EDUCATION/TRAINING PROGRAM

## 2024-02-16 PROCEDURE — 2500000004 HC RX 250 GENERAL PHARMACY W/ HCPCS (ALT 636 FOR OP/ED): Performed by: INTERNAL MEDICINE

## 2024-02-16 PROCEDURE — 1200000002 HC GENERAL ROOM WITH TELEMETRY DAILY

## 2024-02-16 PROCEDURE — 93306 TTE W/DOPPLER COMPLETE: CPT

## 2024-02-16 PROCEDURE — 82140 ASSAY OF AMMONIA: CPT | Performed by: INTERNAL MEDICINE

## 2024-02-16 PROCEDURE — 93005 ELECTROCARDIOGRAM TRACING: CPT

## 2024-02-16 PROCEDURE — 80048 BASIC METABOLIC PNL TOTAL CA: CPT | Performed by: NURSE PRACTITIONER

## 2024-02-16 RX ORDER — ALBUTEROL SULFATE 0.83 MG/ML
3 SOLUTION RESPIRATORY (INHALATION) EVERY 2 HOUR PRN
Status: DISCONTINUED | OUTPATIENT
Start: 2024-02-16 | End: 2024-02-22 | Stop reason: HOSPADM

## 2024-02-16 RX ORDER — DEXTROSE MONOHYDRATE AND SODIUM CHLORIDE 5; .45 G/100ML; G/100ML
100 INJECTION, SOLUTION INTRAVENOUS CONTINUOUS
Status: DISCONTINUED | OUTPATIENT
Start: 2024-02-16 | End: 2024-02-22 | Stop reason: HOSPADM

## 2024-02-16 RX ORDER — FENOFIBRATE 160 MG/1
160 TABLET ORAL DAILY
COMMUNITY

## 2024-02-16 RX ORDER — OMEPRAZOLE 20 MG/1
20 TABLET, DELAYED RELEASE ORAL
COMMUNITY

## 2024-02-16 RX ORDER — PANTOPRAZOLE SODIUM 40 MG/10ML
40 INJECTION, POWDER, LYOPHILIZED, FOR SOLUTION INTRAVENOUS 2 TIMES DAILY
Status: DISCONTINUED | OUTPATIENT
Start: 2024-02-16 | End: 2024-02-22 | Stop reason: HOSPADM

## 2024-02-16 RX ORDER — ACETAMINOPHEN 325 MG/1
650 TABLET ORAL EVERY 4 HOURS PRN
COMMUNITY

## 2024-02-16 RX ORDER — CEFTRIAXONE 1 G/50ML
1 INJECTION, SOLUTION INTRAVENOUS EVERY 24 HOURS
Status: DISCONTINUED | OUTPATIENT
Start: 2024-02-16 | End: 2024-02-19

## 2024-02-16 RX ORDER — ADHESIVE BANDAGE
30 BANDAGE TOPICAL DAILY PRN
COMMUNITY

## 2024-02-16 RX ORDER — MIRTAZAPINE 7.5 MG/1
7.5 TABLET, FILM COATED ORAL NIGHTLY
COMMUNITY

## 2024-02-16 RX ORDER — ALUMINUM HYDROXIDE, MAGNESIUM HYDROXIDE, AND SIMETHICONE 1200; 120; 1200 MG/30ML; MG/30ML; MG/30ML
15 SUSPENSION ORAL EVERY 6 HOURS PRN
COMMUNITY

## 2024-02-16 RX ORDER — ALBUTEROL SULFATE 0.83 MG/ML
3 SOLUTION RESPIRATORY (INHALATION)
Status: DISCONTINUED | OUTPATIENT
Start: 2024-02-16 | End: 2024-02-16

## 2024-02-16 RX ORDER — BISACODYL 10 MG/1
10 SUPPOSITORY RECTAL DAILY PRN
COMMUNITY

## 2024-02-16 RX ADMIN — PANTOPRAZOLE SODIUM 40 MG: 40 INJECTION, POWDER, FOR SOLUTION INTRAVENOUS at 09:04

## 2024-02-16 RX ADMIN — CEFTRIAXONE SODIUM 1 G: 1 INJECTION, SOLUTION INTRAVENOUS at 18:54

## 2024-02-16 RX ADMIN — PIPERACILLIN SODIUM AND TAZOBACTAM SODIUM 4.5 G: 4; .5 INJECTION, SOLUTION INTRAVENOUS at 00:38

## 2024-02-16 RX ADMIN — VANCOMYCIN HYDROCHLORIDE 1.5 G: 1.5 INJECTION, POWDER, LYOPHILIZED, FOR SOLUTION INTRAVENOUS at 02:40

## 2024-02-16 RX ADMIN — PANTOPRAZOLE SODIUM 40 MG: 40 INJECTION, POWDER, FOR SOLUTION INTRAVENOUS at 20:04

## 2024-02-16 RX ADMIN — AZITHROMYCIN 500 MG: 500 INJECTION, POWDER, LYOPHILIZED, FOR SOLUTION INTRAVENOUS at 01:18

## 2024-02-16 RX ADMIN — DEXTROSE AND SODIUM CHLORIDE 75 ML/HR: 5; 450 INJECTION, SOLUTION INTRAVENOUS at 14:29

## 2024-02-16 RX ADMIN — PERFLUTREN 2 ML OF DILUTION: 6.52 INJECTION, SUSPENSION INTRAVENOUS at 14:04

## 2024-02-16 RX ADMIN — SODIUM CHLORIDE 1000 ML: 9 INJECTION, SOLUTION INTRAVENOUS at 01:12

## 2024-02-16 SDOH — SOCIAL STABILITY: SOCIAL INSECURITY: DO YOU FEEL UNSAFE GOING BACK TO THE PLACE WHERE YOU ARE LIVING?: UNABLE TO ASSESS

## 2024-02-16 SDOH — SOCIAL STABILITY: SOCIAL INSECURITY: ARE THERE ANY APPARENT SIGNS OF INJURIES/BEHAVIORS THAT COULD BE RELATED TO ABUSE/NEGLECT?: UNABLE TO ASSESS

## 2024-02-16 SDOH — SOCIAL STABILITY: SOCIAL INSECURITY: DOES ANYONE TRY TO KEEP YOU FROM HAVING/CONTACTING OTHER FRIENDS OR DOING THINGS OUTSIDE YOUR HOME?: UNABLE TO ASSESS

## 2024-02-16 SDOH — SOCIAL STABILITY: SOCIAL INSECURITY: ARE YOU OR HAVE YOU BEEN THREATENED OR ABUSED PHYSICALLY, EMOTIONALLY, OR SEXUALLY BY ANYONE?: UNABLE TO ASSESS

## 2024-02-16 SDOH — SOCIAL STABILITY: SOCIAL INSECURITY: HAS ANYONE EVER THREATENED TO HURT YOUR FAMILY OR YOUR PETS?: UNABLE TO ASSESS

## 2024-02-16 SDOH — SOCIAL STABILITY: SOCIAL INSECURITY: HAVE YOU HAD THOUGHTS OF HARMING ANYONE ELSE?: UNABLE TO ASSESS

## 2024-02-16 SDOH — SOCIAL STABILITY: SOCIAL INSECURITY: WERE YOU ABLE TO COMPLETE ALL THE BEHAVIORAL HEALTH SCREENINGS?: NO

## 2024-02-16 SDOH — SOCIAL STABILITY: SOCIAL INSECURITY: ABUSE: ADULT

## 2024-02-16 SDOH — SOCIAL STABILITY: SOCIAL INSECURITY: DO YOU FEEL ANYONE HAS EXPLOITED OR TAKEN ADVANTAGE OF YOU FINANCIALLY OR OF YOUR PERSONAL PROPERTY?: UNABLE TO ASSESS

## 2024-02-16 ASSESSMENT — PAIN SCALES - GENERAL
PAINLEVEL_OUTOF10: 0 - NO PAIN

## 2024-02-16 ASSESSMENT — ACTIVITIES OF DAILY LIVING (ADL)
HEARING - LEFT EAR: UNABLE TO ASSESS
HEARING - RIGHT EAR: UNABLE TO ASSESS
GROOMING: NEEDS ASSISTANCE
BATHING: DEPENDENT
LACK_OF_TRANSPORTATION: NO
TOILETING: DEPENDENT
ADEQUATE_TO_COMPLETE_ADL: YES
ASSISTIVE_DEVICE: WHEELCHAIR
WALKS IN HOME: DEPENDENT
FEEDING YOURSELF: NEEDS ASSISTANCE
JUDGMENT_ADEQUATE_SAFELY_COMPLETE_DAILY_ACTIVITIES: NO
DRESSING YOURSELF: DEPENDENT
PATIENT'S MEMORY ADEQUATE TO SAFELY COMPLETE DAILY ACTIVITIES?: NO

## 2024-02-16 ASSESSMENT — PATIENT HEALTH QUESTIONNAIRE - PHQ9
SUM OF ALL RESPONSES TO PHQ9 QUESTIONS 1 & 2: 0
2. FEELING DOWN, DEPRESSED OR HOPELESS: NOT AT ALL
1. LITTLE INTEREST OR PLEASURE IN DOING THINGS: NOT AT ALL

## 2024-02-16 ASSESSMENT — COLUMBIA-SUICIDE SEVERITY RATING SCALE - C-SSRS
6. HAVE YOU EVER DONE ANYTHING, STARTED TO DO ANYTHING, OR PREPARED TO DO ANYTHING TO END YOUR LIFE?: NO
1. IN THE PAST MONTH, HAVE YOU WISHED YOU WERE DEAD OR WISHED YOU COULD GO TO SLEEP AND NOT WAKE UP?: NO
2. HAVE YOU ACTUALLY HAD ANY THOUGHTS OF KILLING YOURSELF?: NO

## 2024-02-16 ASSESSMENT — LIFESTYLE VARIABLES
HOW OFTEN DO YOU HAVE 6 OR MORE DRINKS ON ONE OCCASION: NEVER
SUBSTANCE_ABUSE_PAST_12_MONTHS: NO
AUDIT-C TOTAL SCORE: 0
HOW OFTEN DO YOU HAVE A DRINK CONTAINING ALCOHOL: NEVER
PRESCIPTION_ABUSE_PAST_12_MONTHS: NO
HOW MANY STANDARD DRINKS CONTAINING ALCOHOL DO YOU HAVE ON A TYPICAL DAY: PATIENT DOES NOT DRINK
AUDIT-C TOTAL SCORE: 0
SKIP TO QUESTIONS 9-10: 1

## 2024-02-16 ASSESSMENT — COGNITIVE AND FUNCTIONAL STATUS - GENERAL
DRESSING REGULAR LOWER BODY CLOTHING: A LOT
DAILY ACTIVITIY SCORE: 16
WALKING IN HOSPITAL ROOM: A LOT
TOILETING: A LITTLE
EATING MEALS: A LITTLE
STANDING UP FROM CHAIR USING ARMS: A LOT
MOVING FROM LYING ON BACK TO SITTING ON SIDE OF FLAT BED WITH BEDRAILS: A LOT
TURNING FROM BACK TO SIDE WHILE IN FLAT BAD: A LOT
DRESSING REGULAR UPPER BODY CLOTHING: A LITTLE
PERSONAL GROOMING: A LITTLE
MOVING TO AND FROM BED TO CHAIR: A LOT
HELP NEEDED FOR BATHING: A LOT
PATIENT BASELINE BEDBOUND: NO
MOBILITY SCORE: 12
CLIMB 3 TO 5 STEPS WITH RAILING: A LOT

## 2024-02-16 NOTE — ED PROVIDER NOTES
HPI   Chief Complaint   Patient presents with    Altered Mental Status       83-year-old male history of dementia with baseline orientation and time 3 history of atrial fibrillation on anticoagulation presenting to the emergency department for altered mentation.  Patient has an unknown last known normal.  Patient is reporting of no discomfort at this time.  He has no history of provide.  Nursing facility staff states that patient has been altered since yesterday and was transiently hypoxic at facility and had A-fib earlier today on EKG which is known for him.  He was sent in for evaluation.                          [place] Coma Scale Score: 14                     Patient History   Past Medical History:   Diagnosis Date    Diverticulosis of intestine, part unspecified, without perforation or abscess without bleeding     Diverticulosis    Other hemorrhoids     Internal hemorrhoids    Personal history of colonic polyps     History of colonic polyps    Personal history of other diseases of the respiratory system     History of sinusitis    Personal history of other venous thrombosis and embolism     History of deep venous thrombosis    Personal history of pulmonary embolism     Personal history of pulmonary embolism     Past Surgical History:   Procedure Laterality Date    CHOLECYSTECTOMY  06/23/2014    Cholecystectomy    TONSILLECTOMY  06/23/2014    Tonsillectomy     No family history on file.  Social History     Tobacco Use    Smoking status: Some Days     Types: Cigarettes    Smokeless tobacco: Never   Substance Use Topics    Alcohol use: Not Currently    Drug use: Not on file       Physical Exam   ED Triage Vitals [02/15/24 2200]   Temp Heart Rate Respirations BP   -- 64 18 94/71      Pulse Ox Temp Source Heart Rate Source Patient Position   98 % Temporal Monitor Lying      BP Location FiO2 (%)     Right arm --       Physical Exam  Vitals reviewed.   Constitutional:       Appearance: Normal appearance.   HENT:       Head: Normocephalic and atraumatic.      Nose: Nose normal.      Mouth/Throat:      Mouth: Mucous membranes are moist.   Eyes:      Extraocular Movements: Extraocular movements intact.      Conjunctiva/sclera: Conjunctivae normal.   Cardiovascular:      Rate and Rhythm: Normal rate. Rhythm irregular.      Pulses: Normal pulses.      Heart sounds: Normal heart sounds.   Pulmonary:      Effort: Pulmonary effort is normal.      Breath sounds: Normal breath sounds.   Abdominal:      General: Abdomen is flat. Bowel sounds are normal.      Palpations: Abdomen is soft.   Musculoskeletal:         General: Normal range of motion.   Skin:     General: Skin is warm and dry.   Neurological:      Mental Status: He is alert. He is disoriented.      GCS: GCS eye subscore is 4. GCS verbal subscore is 3. GCS motor subscore is 6.      Cranial Nerves: Cranial nerves 2-12 are intact. No cranial nerve deficit or facial asymmetry.      Sensory: Sensation is intact.      Motor: Motor function is intact.   Psychiatric:         Mood and Affect: Mood normal.         ED Course & [facility]   ED Course as of 02/16/24 0108   Thu Feb 15, 2024   2218 83-year-old male history of dementia presents emergency emergency department with a baseline orientation of alert and oriented x 3 history of atrial fibrillation on anticoagulation presenting to the emergency department for altered mentation.  On examination patient is a blood pressure of 94/71 irregularly irregular otherwise no murmurs appreciated equal breath sounds bilaterally abdomen nontender no gross deformities appreciated sufferer sacral stage I decubitus ulcer.  Differential diagnosis includes intracranial hemorrhage urinary tract infection electrolyte abnormality.  CBC CMP EKG troponin urinalysis CT imaging the head chest x-ray been ordered along with IV normal saline. [ZS]   2342 Right-sided interstitial disease appreciated.  Patient will be started on ceftriaxone and azithromycin. [ZS]   2345 CT  imaging the head on my interpretation shows no evidence of acute intracranial process. [ZS]   Fri Feb 16, 2024   0008 Patient will be admitted to medicine at this time. [ZS]   0025 Atrial fibrillation ventricular 83 SC interval 103  T wave inversions in anterior lateral leads with no ST elevation or depression or axis deviation.  Initial troponin was 30 1 repeat troponin pending however patient also has acute kidney injury with a creatinine of 1.36.  No signs leukocytosis appreciated. [ZS]   0036 CT head wo IV contrast [ZS]   0036 XR chest 1 view [ZS]   0108 Troponin Series, (0, 1 HR)(!) [ZS]   0108 Comprehensive metabolic panel(!) [ZS]   0108 Will admit to medicine [ZS]      ED Course User Index  [ZS] Lázaro Fry MD         Diagnoses as of 02/16/24 0108   Altered mental status, unspecified altered mental status type   HONG (acute kidney injury) (CMS/HCC)   Elevated troponin   Pneumonia due to infectious organism, unspecified laterality, unspecified part of lung       Medical Decision Making      Procedure  Procedures     Lázaro Fry MD  02/16/24 0108

## 2024-02-16 NOTE — PROGRESS NOTES
Moshe Velázquez is a 83 y.o. male on day 0 of admission presenting with Acute metabolic encephalopathy.      Subjective   Patient fully evaluated on February 16 and clinically improved still slightly confused       Objective     Last Recorded Vitals  /71   Pulse 95   Temp 36 °C (96.8 °F)   Resp 20   Wt 55.8 kg (123 lb)   SpO2 96%   Intake/Output last 3 Shifts:    Intake/Output Summary (Last 24 hours) at 2/16/2024 1609  Last data filed at 2/16/2024 1552  Gross per 24 hour   Intake 2627.75 ml   Output --   Net 2627.75 ml       Admission Weight  Weight: 55.8 kg (123 lb) (02/15/24 2200)    Daily Weight  02/15/24 : 55.8 kg (123 lb)    Image Results  ECG 12 lead  Atrial fibrillation  Anteroseptal infarct, old  Repol abnrm suggests ischemia, diffuse leads      Physical Exam    Relevant Results               Assessment/Plan                  Principal Problem:    Acute metabolic encephalopathy          Hugo Anderson MD  Physician  Internal Medicine     H&P      Addendum     Date of Service: 2/15/2024  9:54 PM     Addendum       Expand All Collapse All    History Of Present Illness  Moshe Velázquez is a 83 y.o. male presenting to emergency department for evaluation of altered mental status.  Per skilled nursing facility he has been altered yesterday and was transiently hypoxic.  Patient has a history of dementia.  Patient is currently alert and oriented x 3 and denies any complaints.  Patient denies recent illness, nausea, vomiting, diarrhea.  Patient denies chest pain or dizziness.     In ED, EKG completed showing A-fib at a rate of 83 without ST elevation or depression per ER physician review.  CT imaging of the head completed and negative for acute process.  Chest x-ray completed showing right-sided interstitial disease, small bilateral pleural effusion, no evidence of pneumonia or consolidation.  Urinalysis pending patient started on IV antibiotics in ED.  Blood cultures pending.  Glucose 109, bicarb 34, BUN  59, creatinine 1.36, GFR 52, alk phos 32, bilirubin 1.6, troponin 31, 27.  , PT 15.6, INR 1.4.  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  Blood pressure 113/58, heart rate 94, respirations 30, temperature 37.5 °C, SpO2 96% on supplemental oxygen.  Last echocardiogram on file 7/27/2021 with an EF of 50 to 55%.  Patient admitted to telemetry under the care of Dr. Anderson who will continue to follow.  I was asked to do H&P and place initial admission orders.     Past Medical History  Diverticulosis, internal hemorrhoids, sinusitis, PE, DVT, A-fib on anticoagulation, diabetes, hypertension, CAD, BPH, lymphoma, GERD, ulcerative colitis, CHF        Surgical History  Tonsillectomy, cholecystectomy, left knee surgery, bowel resection, appendectomy, cardiac catheterization, colonoscopy, CABG        Social History  Former smoker, no drug use, social alcohol use  Family History  Reviewed and noncontributory     Allergies  Dilaudid [hydromorphone]     Review of Systems  A 10 point review of systems was completed and negative except what is listed in HPI  Physical Exam  Constitutional:       Appearance: Normal appearance.   HENT:      Mouth/Throat:      Mouth: Mucous membranes are dry.      Pharynx: Oropharynx is clear.   Cardiovascular:      Rate and Rhythm: Rhythm irregularly irregular.   Pulmonary:      Comments: Mildly diminished breath sounds  Abdominal:      General: Bowel sounds are normal.      Palpations: Abdomen is soft.   Musculoskeletal:         General: Normal range of motion.      Cervical back: Normal range of motion.   Skin:     General: Skin is warm and dry.      Capillary Refill: Capillary refill takes less than 2 seconds.   Neurological:      General: No focal deficit present.      Mental Status: He is alert. Mental status is at baseline.   Psychiatric:         Mood and Affect: Mood normal.            Last Recorded Vitals  Blood pressure 113/58, pulse 94, resp. rate (!) 30,  height 1.829 m (6'), weight 55.8 kg (123 lb), SpO2 96 %.     Relevant Results  CT head wo IV contrast     Result Date: 2/15/2024  Interpreted By:  Weston Suarez, STUDY: CT HEAD WO IV CONTRAST;  2/15/2024 11:15 pm   INDICATION: Signs/Symptoms:ams.   COMPARISON: None.   ACCESSION NUMBER(S): [id number]   ORDERING CLINICIAN: JENNY MULLER   TECHNIQUE: Axial noncontrast CT images of the head.   FINDINGS: Gray-white matter differentiation is maintained. There are no extra-axial collections. No mass effect or midline shift. There is no hydrocephalus. There is prominence of the bifrontal extra-axial spaces and sylvian fissures which may represent focal cerebral volume loss.   HEMORRHAGE: No acute intracranial hemorrhage.   CALVARIUM: No depressed skull fracture.   EXTRACRANIAL SOFT TISSUES:... Unremarkable.   PARANASAL SINUSES/MASTOIDS: The visualized paranasal sinuses are well aerated. Mastoid air cells are clear.   ORBITS: Grossly normal.        No acute intracranial hemorrhage or acute cortical infarct.     Signed by: Weston Suarez 2/15/2024 11:58 PM Dictation workstation:   [id number]     XR chest 1 view     Result Date: 2/15/2024  Interpreted By:  Weston Suarez, STUDY: XR CHEST 1 VIEW;  2/15/2024 10:18 pm   INDICATION: Signs/Symptoms:ams.   COMPARISON: None.   ACCESSION NUMBER(S): [id number]   ORDERING CLINICIAN: JENNY MULLER   FINDINGS:   CARDIOMEDIASTINAL SILHOUETTE: Cardiomediastinal silhouette is normal in size and configuration taking into account portable technique and patient rotation. There are post CABG changes.   LUNGS/PLEURA: There are no consolidations. There is mild blunting of the costophrenic angles suggestive of small bilateral pleural effusions.. There is no demonstrated pneumothorax.     BONES: No evidence of acute osseous abnormality.        1. Suggestion of small bilateral pleural effusions. No evidence of consolidation.     Signed by: Weston Suarez 2/15/2024 10:54 PM Dictation workstation:    [id number]         Assessment/Plan   Moshe is an 83-year-old male patient who presents to emergency department from skilled nursing facility for altered mental status.  Patient is currently alert and oriented, has a history of dementia.  Patient denies any complaints.  Patient was reportedly hypoxic at the nursing facility.  Patient has a history of A-fib and is on anticoagulation.  EKG in ED showing A-fib with a controlled rate.  Chest x-ray completed showing pleural effusions and interstitial edema without evidence of pneumonia or consolidation.  Antibiotics given in ED but held for admission.  Echocardiogram ordered for the morning as last echocardiogram on file was from 7/27/2021.  BNP elevated in the 700s.  IV fluids held due to elevated BNP, awaiting home medications.  Patient admitted for further medical management.     Acute metabolic encephalopathy/elevated troponin/HONG  See imaging results above  No evidence of pneumonia or consolidation    Hold IV fluids due to fluid overload  Trend troponin  Telemetry monitoring  Repeat labs in a.m.   Daily weight  Intake and output  urinalysis pending     A-fib/hypertension/CAD/BPH/lymphoma/diabetes/ulcerative colitis/GERD  Cardiac/diabetic diet  ISS  Hypoglycemia protocol  Telemetry monitoring   Continue home Xarelto when med rec is complete  Hold ACEs and ARB's 2/2 HONG     DVT Ppx  SCDs    continue home Xarelto when med rec is complete  PT/OT        I spent 35 minutes in the professional and overall care of this patient.     Patient fully evaluated and plan as above                       Revision History    Patient fully evaluated on February 16.  Continue to recheck urine and ammonia levels.  IV Rocephin given recheck labs in AM.              Hugo Anderson MD

## 2024-02-16 NOTE — PROGRESS NOTES
Pharmacy Medication History Review    Moshe Velázquez is a 83 y.o. male admitted for Acute metabolic encephalopathy. Pharmacy reviewed the patient's [id number]-[id number] medications and allergies for accuracy.    The list below reflects the updated PTA list. Comments regarding how patient may be taking medications differently can be found in the Admit Orders Activity  Prior to Admission Medications   Prescriptions Last Dose Informant Patient Reported? Taking?   Xarelto 20 mg tablet  Other Yes Yes   Sig: Take 1 tablet (20 mg) by mouth once daily.   acetaminophen (Tylenol) 325 mg tablet  Other Yes Yes   Sig: Take 2 tablets (650 mg) by mouth every 4 hours if needed for mild pain (1 - 3) or fever (temp greater than 38.0 C).   alum-mag hydroxide-simeth (Mylanta) 200-200-20 mg/5 mL oral suspension  Other Yes Yes   Sig: Take 15 mL by mouth every 6 hours if needed for indigestion or heartburn.   bisacodyl (Dulcolax, bisacodyl,) 10 mg suppository  Other Yes Yes   Sig: Insert 1 suppository (10 mg) into the rectum once daily as needed for constipation.   escitalopram (Lexapro) 10 mg tablet  Other No Yes   Sig: Take 1 tablet (10 mg) by mouth once daily.   fenofibrate (Triglide) 160 mg tablet  Other Yes Yes   Sig: Take 1 tablet (160 mg) by mouth once daily.   fluticasone (Flonase) 50 mcg/actuation nasal spray  Other No Yes   Sig: Administer 1 spray into each nostril once daily. Shake gently. Before first use, prime pump. After use, clean tip and replace cap.   folic acid/multivit-min/lutein (CENTRUM SILVER ORAL)  Other Yes Yes   Sig: Take 1 tablet by mouth once daily.   lisinopril 20 mg tablet  Other Yes Yes   Sig: Take 1 tablet (20 mg) by mouth once daily.   magnesium hydroxide (Milk of Magnesia) 400 mg/5 mL suspension  Other Yes Yes   Sig: Take 30 mL by mouth once daily as needed for constipation.   metoprolol succinate XL (Toprol-XL) 50 mg 24 hr tablet  Other Yes Yes   Sig: Take 1 tablet (50 mg) by mouth once daily. Do not crush  or chew.   mirtazapine (Remeron) 7.5 mg tablet  Other Yes Yes   Sig: Take 1 tablet (7.5 mg) by mouth once daily at bedtime.   nitroglycerin (Nitrostat) 0.4 mg SL tablet  Other Yes Yes   Sig: Place 1 tablet (0.4 mg) under the tongue.   omeprazole OTC (PriLOSEC OTC) 20 mg EC tablet  Other Yes Yes   Sig: Take 1 tablet (20 mg) by mouth once daily in the morning. Take before meals. Do not crush, chew, or split.   simvastatin (Zocor) 20 mg tablet  Other Yes Yes   Sig: Take 1 tablet (20 mg) by mouth once daily at bedtime.   spironolactone (Aldactone) 50 mg tablet Not Taking Other No No   Sig: Take 1 tablet (50 mg) by mouth once daily for 7 days.   Patient not taking: Reported on 2/16/2024   tamsulosin (Flomax) 0.4 mg 24 hr capsule Not Taking Other No No   Sig: TAKE 1 CAPSULE BY MOUTH EVERY DAY   Patient not taking: Reported on 2/16/2024   torsemide (Demadex) 20 mg tablet Not Taking  No No   Sig: Take 1 tablet (20 mg) by mouth once daily for 7 days.   Patient not taking: Reported on 2/16/2024   traZODone (Desyrel) 100 mg tablet  Other No Yes   Sig: Take 1 tablet (100 mg) by mouth as needed at bedtime for sleep.      Facility-Administered Medications: None        The list below reflects the updated allergy list. Please review each documented allergy for additional clarification and justification.  Allergies  Reviewed by Rachel Mendoza RN on 2/15/2024        Severity Reactions Comments    Dilaudid [hydromorphone] Not Specified Unknown             Patient was unable to be assessed for M2B at discharge. Pharmacy has been updated to N/A - patient is from facility.    Sources used to complete the med history include   [facility] Order Summary Report generated 2/15/24    Below are additional concerns with the patient's PTA list.  None    Khushbu Willis, Nimo   Transitions of Care Pharmacist   [facility]  Please reach out via Secure Chat for questions, or if no response call  [phone] or [facility]era [facility]

## 2024-02-16 NOTE — H&P
History Of Present Illness  Moshe Velázquez is a 83 y.o. male presenting to emergency department for evaluation of altered mental status.  Per skilled nursing facility he has been altered yesterday and was transiently hypoxic.  Patient has a history of dementia.  Patient is currently alert and oriented x 3 and denies any complaints.  Patient denies recent illness, nausea, vomiting, diarrhea.  Patient denies chest pain or dizziness.    In ED, EKG completed showing A-fib at a rate of 83 without ST elevation or depression per ER physician review.  CT imaging of the head completed and negative for acute process.  Chest x-ray completed showing right-sided interstitial disease, small bilateral pleural effusion, no evidence of pneumonia or consolidation.  Urinalysis pending patient started on IV antibiotics in ED.  Blood cultures pending.  Glucose 109, bicarb 34, BUN 59, creatinine 1.36, GFR 52, alk phos 32, bilirubin 1.6, troponin 31, 27.  , PT 15.6, INR 1.4.  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  Blood pressure 113/58, heart rate 94, respirations 30, temperature 37.5 °C, SpO2 96% on supplemental oxygen.  Last echocardiogram on file 7/27/2021 with an EF of 50 to 55%.  Patient admitted to telemetry under the care of Dr. Anderson who will continue to follow.  I was asked to do H&P and place initial admission orders.     Past Medical History  Diverticulosis, internal hemorrhoids, sinusitis, PE, DVT, A-fib on anticoagulation, diabetes, hypertension, CAD, BPH, lymphoma, GERD, ulcerative colitis, CHF      Surgical History  Tonsillectomy, cholecystectomy, left knee surgery, bowel resection, appendectomy, cardiac catheterization, colonoscopy, CABG       Social History  Former smoker, no drug use, social alcohol use  Family History  Reviewed and noncontributory     Allergies  Dilaudid [hydromorphone]    Review of Systems  A 10 point review of systems was completed and negative except what is  listed in HPI  Physical Exam  Constitutional:       Appearance: Normal appearance.   HENT:      Mouth/Throat:      Mouth: Mucous membranes are dry.      Pharynx: Oropharynx is clear.   Cardiovascular:      Rate and Rhythm: Rhythm irregularly irregular.   Pulmonary:      Comments: Mildly diminished breath sounds  Abdominal:      General: Bowel sounds are normal.      Palpations: Abdomen is soft.   Musculoskeletal:         General: Normal range of motion.      Cervical back: Normal range of motion.   Skin:     General: Skin is warm and dry.      Capillary Refill: Capillary refill takes less than 2 seconds.   Neurological:      General: No focal deficit present.      Mental Status: He is alert. Mental status is at baseline.   Psychiatric:         Mood and Affect: Mood normal.          Last Recorded Vitals  Blood pressure 113/58, pulse 94, resp. rate (!) 30, height 1.829 m (6'), weight 55.8 kg (123 lb), SpO2 96 %.    Relevant Results  CT head wo IV contrast    Result Date: 2/15/2024  Interpreted By:  Weston Suarez, STUDY: CT HEAD WO IV CONTRAST;  2/15/2024 11:15 pm   INDICATION: Signs/Symptoms:ams.   COMPARISON: None.   ACCESSION NUMBER(S): [id number]   ORDERING CLINICIAN: JENNY MULLER   TECHNIQUE: Axial noncontrast CT images of the head.   FINDINGS: Gray-white matter differentiation is maintained. There are no extra-axial collections. No mass effect or midline shift. There is no hydrocephalus. There is prominence of the bifrontal extra-axial spaces and sylvian fissures which may represent focal cerebral volume loss.   HEMORRHAGE: No acute intracranial hemorrhage.   CALVARIUM: No depressed skull fracture.   EXTRACRANIAL SOFT TISSUES:... Unremarkable.   PARANASAL SINUSES/MASTOIDS: The visualized paranasal sinuses are well aerated. Mastoid air cells are clear.   ORBITS: Grossly normal.       No acute intracranial hemorrhage or acute cortical infarct.     Signed by: Weston Suarez 2/15/2024 11:58 PM Dictation workstation:    [id number]    XR chest 1 view    Result Date: 2/15/2024  Interpreted By:  Weston Suarez, STUDY: XR CHEST 1 VIEW;  2/15/2024 10:18 pm   INDICATION: Signs/Symptoms:ams.   COMPARISON: None.   ACCESSION NUMBER(S): [id number]   ORDERING CLINICIAN: JENNY MULLER   FINDINGS:   CARDIOMEDIASTINAL SILHOUETTE: Cardiomediastinal silhouette is normal in size and configuration taking into account portable technique and patient rotation. There are post CABG changes.   LUNGS/PLEURA: There are no consolidations. There is mild blunting of the costophrenic angles suggestive of small bilateral pleural effusions.. There is no demonstrated pneumothorax.     BONES: No evidence of acute osseous abnormality.       1. Suggestion of small bilateral pleural effusions. No evidence of consolidation.     Signed by: Weston Suarez 2/15/2024 10:54 PM Dictation workstation:   [id number]     Assessment/Plan   Moshe is an 83-year-old male patient who presents to emergency department from skilled nursing facility for altered mental status.  Patient is currently alert and oriented, has a history of dementia.  Patient denies any complaints.  Patient was reportedly hypoxic at the nursing facility.  Patient has a history of A-fib and is on anticoagulation.  EKG in ED showing A-fib with a controlled rate.  Chest x-ray completed showing pleural effusions and interstitial edema without evidence of pneumonia or consolidation.  Antibiotics given in ED but held for admission.  Echocardiogram ordered for the morning as last echocardiogram on file was from 7/27/2021.  BNP elevated in the 700s.  IV fluids held due to elevated BNP, awaiting home medications.  Patient admitted for further medical management.    Acute metabolic encephalopathy/elevated troponin/HONG  See imaging results above  No evidence of pneumonia or consolidation    Hold IV fluids due to fluid overload  Trend troponin  Telemetry monitoring  Repeat labs in a.m.   Daily weight  Intake and  output  urinalysis pending    A-fib/hypertension/CAD/BPH/lymphoma/diabetes/ulcerative colitis/GERD  Cardiac/diabetic diet  ISS  Hypoglycemia protocol  Telemetry monitoring   Continue home Xarelto when med rec is complete  Hold ACEs and ARB's 2/2 HONG    DVT Ppx  SCDs    continue home Xarelto when med rec is complete  PT/OT      I spent 35 minutes in the professional and overall care of this patient.    Patient fully evaluated and plan as above  Nissa Vivas, ROSSANA-CNP

## 2024-02-17 LAB
ALBUMIN SERPL BCP-MCNC: 2.7 G/DL (ref 3.4–5)
ALP SERPL-CCNC: 25 U/L (ref 33–136)
ALT SERPL W P-5'-P-CCNC: 22 U/L (ref 10–52)
ANION GAP SERPL CALC-SCNC: 9 MMOL/L (ref 10–20)
AST SERPL W P-5'-P-CCNC: 29 U/L (ref 9–39)
BILIRUB SERPL-MCNC: 1 MG/DL (ref 0–1.2)
BNP SERPL-MCNC: 1237 PG/ML (ref 0–99)
BUN SERPL-MCNC: 45 MG/DL (ref 6–23)
CALCIUM SERPL-MCNC: 8.7 MG/DL (ref 8.6–10.3)
CHLORIDE SERPL-SCNC: 99 MMOL/L (ref 98–107)
CO2 SERPL-SCNC: 35 MMOL/L (ref 21–32)
CREAT SERPL-MCNC: 1.01 MG/DL (ref 0.5–1.3)
EGFRCR SERPLBLD CKD-EPI 2021: 74 ML/MIN/1.73M*2
ERYTHROCYTE [DISTWIDTH] IN BLOOD BY AUTOMATED COUNT: 16.8 % (ref 11.5–14.5)
GLUCOSE BLD MANUAL STRIP-MCNC: 104 MG/DL (ref 74–99)
GLUCOSE BLD MANUAL STRIP-MCNC: 130 MG/DL (ref 74–99)
GLUCOSE BLD MANUAL STRIP-MCNC: 138 MG/DL (ref 74–99)
GLUCOSE BLD MANUAL STRIP-MCNC: 96 MG/DL (ref 74–99)
GLUCOSE SERPL-MCNC: 112 MG/DL (ref 74–99)
HCT VFR BLD AUTO: 43.2 % (ref 41–52)
HGB BLD-MCNC: 14.3 G/DL (ref 13.5–17.5)
HOLD SPECIMEN: NORMAL
MCH RBC QN AUTO: 32.4 PG (ref 26–34)
MCHC RBC AUTO-ENTMCNC: 33.1 G/DL (ref 32–36)
MCV RBC AUTO: 98 FL (ref 80–100)
NRBC BLD-RTO: 0 /100 WBCS (ref 0–0)
PLATELET # BLD AUTO: 194 X10*3/UL (ref 150–450)
POTASSIUM SERPL-SCNC: 3.9 MMOL/L (ref 3.5–5.3)
PROT SERPL-MCNC: 4.5 G/DL (ref 6.4–8.2)
RBC # BLD AUTO: 4.42 X10*6/UL (ref 4.5–5.9)
SODIUM SERPL-SCNC: 139 MMOL/L (ref 136–145)
WBC # BLD AUTO: 6.3 X10*3/UL (ref 4.4–11.3)

## 2024-02-17 PROCEDURE — 83880 ASSAY OF NATRIURETIC PEPTIDE: CPT | Performed by: INTERNAL MEDICINE

## 2024-02-17 PROCEDURE — 2500000004 HC RX 250 GENERAL PHARMACY W/ HCPCS (ALT 636 FOR OP/ED): Performed by: NURSE PRACTITIONER

## 2024-02-17 PROCEDURE — 82947 ASSAY GLUCOSE BLOOD QUANT: CPT

## 2024-02-17 PROCEDURE — 36415 COLL VENOUS BLD VENIPUNCTURE: CPT | Performed by: INTERNAL MEDICINE

## 2024-02-17 PROCEDURE — 84075 ASSAY ALKALINE PHOSPHATASE: CPT | Performed by: INTERNAL MEDICINE

## 2024-02-17 PROCEDURE — C9113 INJ PANTOPRAZOLE SODIUM, VIA: HCPCS | Performed by: NURSE PRACTITIONER

## 2024-02-17 PROCEDURE — 1200000002 HC GENERAL ROOM WITH TELEMETRY DAILY

## 2024-02-17 PROCEDURE — 85027 COMPLETE CBC AUTOMATED: CPT | Performed by: INTERNAL MEDICINE

## 2024-02-17 PROCEDURE — 2500000004 HC RX 250 GENERAL PHARMACY W/ HCPCS (ALT 636 FOR OP/ED): Performed by: INTERNAL MEDICINE

## 2024-02-17 RX ADMIN — SODIUM CHLORIDE 500 ML: 9 INJECTION, SOLUTION INTRAVENOUS at 20:25

## 2024-02-17 RX ADMIN — PANTOPRAZOLE SODIUM 40 MG: 40 INJECTION, POWDER, FOR SOLUTION INTRAVENOUS at 11:04

## 2024-02-17 RX ADMIN — DEXTROSE AND SODIUM CHLORIDE 75 ML/HR: 5; 450 INJECTION, SOLUTION INTRAVENOUS at 04:32

## 2024-02-17 RX ADMIN — DEXTROSE AND SODIUM CHLORIDE 75 ML/HR: 5; 450 INJECTION, SOLUTION INTRAVENOUS at 18:04

## 2024-02-17 RX ADMIN — CEFTRIAXONE SODIUM 1 G: 1 INJECTION, SOLUTION INTRAVENOUS at 18:04

## 2024-02-17 RX ADMIN — PANTOPRAZOLE SODIUM 40 MG: 40 INJECTION, POWDER, FOR SOLUTION INTRAVENOUS at 20:25

## 2024-02-17 ASSESSMENT — PAIN - FUNCTIONAL ASSESSMENT: PAIN_FUNCTIONAL_ASSESSMENT: 0-10

## 2024-02-17 ASSESSMENT — PAIN SCALES - GENERAL: PAINLEVEL_OUTOF10: 0 - NO PAIN

## 2024-02-17 NOTE — PROGRESS NOTES
Victor M Mathias is a 83 y.o. male on day 1 of admission presenting with Acute metabolic encephalopathy.      Subjective   Patient fully evaluated on February 16 and clinically improved still slightly confused       Objective     Last Recorded Vitals  BP 96/54   Pulse 90   Temp 35.8 °C (96.4 °F)   Resp 20   Wt 55.8 kg (123 lb)   SpO2 97%   Intake/Output last 3 Shifts:    Intake/Output Summary (Last 24 hours) at 2/17/2024 1624  Last data filed at 2/17/2024 0431  Gross per 24 hour   Intake 50 ml   Output 700 ml   Net -650 ml         Admission Weight  Weight: 55.8 kg (123 lb) (02/15/24 2200)    Daily Weight  02/15/24 : 55.8 kg (123 lb)    Image Results  ECG 12 lead  Atrial fibrillation  Anteroseptal infarct, old  Repol abnrm suggests ischemia, diffuse leads    See ED provider note for full interpretation and clinical correlation  Confirmed by Charisma Díaz ([id number]) on 2/16/2024 5:30:36 PM  Transthoracic Echo (TTE) [facility], [address] [place] [id number] [phone] and                                  Fax [phone]    TRANSTHORACIC ECHOCARDIOGRAM REPORT       Patient Name:      VICTOR M MATHIAS       Reading Physician:    [id number] Kael Real MD  Study Date:        2/16/2024            Ordering Provider:    [id number] REJI WASHINGTON  MRN/PID:           [id number]             Fellow:  Accession#:        [id number]         Nurse:                Grace Matamoros RN  Date of Birth/Age: 1940 / 83 years Sonographer:          Lorna Olivares RDCS  Gender:            M                    Additional Staff:  Height:            182.88 cm            Admit Date:           2/16/2024  Weight:            55.79 kg             Admission  Status:     Inpatient -                                                                Routine  BSA / BMI:         1.73 m2 / 16.68      Encounter#:           [id number]                     kg/m2                                          Department Location:  [facility]  Blood Pressure: 112 /71 mmHg    Study Type:    TRANSTHORACIC ECHO (TTE) COMPLETE  Diagnosis/ICD: Other fluid overload-E87.79  Indication:    A-Fib AMS  CPT Code:      Echo Complete w Full Doppler-15571    Patient History:  Pertinent History: CAD, PVD, A-Fib, HTN and Hyperlipidemia. Hx CABG, DM.    Study Detail: The following Echo studies were performed: 2D, M-Mode, Doppler and                color flow. Technically challenging study due to patient lying in                supine position and body habitus. Definity used as a contrast                agent for endocardial border definition. Total contrast used for                this procedure was 2 mL via IV push.       PHYSICIAN INTERPRETATION:  Left Ventricle: The left ventricular systolic function is moderately decreased, with an estimated ejection fraction of 30-35%. The patient is in atrial fibrillation which may influence the estimate of left ventricular function and transvalvular flows. There is global hypokinesis of the left ventricle with minor regional variations. The left ventricular cavity size is normal. Left ventricular diastolic filling was indeterminate. Distal LV septum and LV apex are hypokinetic.  Left Atrium: The left atrium is moderately dilated.  Right Ventricle: The right ventricle is normal in size. There is reduced right ventricular systolic function.  Right Atrium: The right atrium is mildly dilated.  Aortic Valve: The aortic valve is trileaflet. There is mild aortic valve cusp calcification. There is mild aortic valve thickening. There is trace to mild aortic valve regurgitation. The peak instantaneous gradient of the aortic valve is 5.3 mmHg.  Mitral Valve: The mitral  valve is mildly thickened. The mitral valve spectral Doppler A-wave is absent, consistent with atrial fibrillation. There is mild mitral annular calcification. There is mild mitral valve regurgitation.  Tricuspid Valve: The tricuspid valve is structurally normal. There is mild tricuspid regurgitation. The Doppler estimated RVSP is slightly elevated at 31.4 mmHg.  Pulmonic Valve: The pulmonic valve is structurally normal. There is physiologic pulmonic valve regurgitation.  Pericardium: There is no pericardial effusion noted.  Aorta: The aortic root is normal. There is upper limits of normal dilatation of the ascending aorta.  Systemic Veins: The inferior vena cava appears to be of normal size.  In comparison to the previous echocardiogram(s): There are no prior studies on this patient for comparison purposes.       CONCLUSIONS:   1. Left ventricular systolic function is moderately decreased with a 30-35% estimated ejection fraction.   2. There is global hypokinesis of the left ventricle with minor regional variations.   3. Distal LV septum and LV apex are hypokinetic.   4. The patient is in atrial fibrillation which may influence the estimate of left ventricular function and transvalvular flows.   5. The left atrium is moderately dilated.   6. Absent A-wave on MV spectral Doppler tracing, consistent with atrial fibrillation.   7. There is reduced right ventricular systolic function.   8. Slightly elevated RVSP.    QUANTITATIVE DATA SUMMARY:  2D MEASUREMENTS:                            Normal Ranges:  Ao Root d:     3.60 cm    (2.0-3.7cm)  LAs:           5.10 cm    (2.7-4.0cm)  IVSd:          1.19 cm    (0.6-1.1cm)  LVPWd:         1.11 cm    (0.6-1.1cm)  LVIDd:         4.80 cm    (3.9-5.9cm)  LVIDs:         4.07 cm  LV Mass Index: 119.1 g/m2  LV % FS        15.2 %    LA VOLUME:                                Normal Ranges:  LA Vol A4C:        69.2 ml    (22+/-6mL/m2)  LA Vol A2C:        91.1 ml  LA Vol BP:          81.8 ml  LA Vol Index A4C:  39.9ml/m2  LA Vol Index A2C:  52.6 ml/m2  LA Vol Index BP:   47.2 ml/m2  LA Area A4C:       23.7 cm2  LA Area A2C:       26.4 cm2  LA Major Axis A4C: 6.9 cm  LA Major Axis A2C: 6.5 cm  LA Volume Index:   43.4 ml/m2  LA Vol A4C:        64.0 ml  LA Vol A2C:        84.8 ml    RA VOLUME BY A/L METHOD:                        Normal Ranges:  RA Area A4C: 19.0 cm2    M-MODE MEASUREMENTS:                   Normal Ranges:  Ao Root: 3.80 cm (2.0-3.7cm)  LAs:     4.50 cm (2.7-4.0cm)    AORTA MEASUREMENTS:                     Normal Ranges:  Asc Ao, d: 3.80 cm (2.1-3.4cm)    LV SYSTOLIC FUNCTION BY 2D PLANIMETRY (MOD):                      Normal Ranges:  EF-A4C View: 32.6 % (>=55%)  EF-A2C View: 48.8 %  EF-Biplane:  37.9 %    AORTIC VALVE:                          Normal Ranges:  AoV Vmax:      1.15 m/s (<=1.7m/s)  AoV Peak P.3 mmHg (<20mmHg)  LVOT Max Rob:  0.49 m/s (<=1.1m/s)  LVOT VTI:      6.87 cm  LVOT Diameter: 2.20 cm  (1.8-2.4cm)  AoV Area,Vmax: 1.63 cm2 (2.5-4.5cm2)    AORTIC INSUFFICIENCY:  AI Vmax:       2.39 m/s  AI Half-time:  420 msec  AI Decel Rate: 167.00 cm/s2       RIGHT VENTRICLE:  RV Basal 3.51 cm  RV Mid   1.90 cm  RV Major 7.6 cm  TAPSE:   12.2 mm  RV s'    0.08 m/s    TRICUSPID VALVE/RVSP:                              Normal Ranges:  Peak TR Velocity: 2.57 m/s  RV Syst Pressure: 31.4 mmHg (< 30mmHg)  IVC Diam:         1.64 cm       [id number] Kael Real MD  Electronically signed on 2024 at 4:48:12 PM       ** Final **      Physical Exam    Relevant Results               Assessment/Plan                  Principal Problem:    Acute metabolic encephalopathy          Hugo Anderson MD  Physician  Internal Medicine     H&P      Addendum     Date of Service: 2/15/2024  9:54 PM     Addendum       Expand All Collapse All    History Of Present Illness  Moshe Velázquez is a 83 y.o. male presenting to emergency department for evaluation of altered mental status.  Per skilled  nursing facility he has been altered yesterday and was transiently hypoxic.  Patient has a history of dementia.  Patient is currently alert and oriented x 3 and denies any complaints.  Patient denies recent illness, nausea, vomiting, diarrhea.  Patient denies chest pain or dizziness.     In ED, EKG completed showing A-fib at a rate of 83 without ST elevation or depression per ER physician review.  CT imaging of the head completed and negative for acute process.  Chest x-ray completed showing right-sided interstitial disease, small bilateral pleural effusion, no evidence of pneumonia or consolidation.  Urinalysis pending patient started on IV antibiotics in ED.  Blood cultures pending.  Glucose 109, bicarb 34, BUN 59, creatinine 1.36, GFR 52, alk phos 32, bilirubin 1.6, troponin 31, 27.  , PT 15.6, INR 1.4.  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  Blood pressure 113/58, heart rate 94, respirations 30, temperature 37.5 °C, SpO2 96% on supplemental oxygen.  Last echocardiogram on file 7/27/2021 with an EF of 50 to 55%.  Patient admitted to telemetry under the care of Dr. Anderson who will continue to follow.  I was asked to do H&P and place initial admission orders.     Past Medical History  Diverticulosis, internal hemorrhoids, sinusitis, PE, DVT, A-fib on anticoagulation, diabetes, hypertension, CAD, BPH, lymphoma, GERD, ulcerative colitis, CHF        Surgical History  Tonsillectomy, cholecystectomy, left knee surgery, bowel resection, appendectomy, cardiac catheterization, colonoscopy, CABG        Social History  Former smoker, no drug use, social alcohol use  Family History  Reviewed and noncontributory     Allergies  Dilaudid [hydromorphone]     Review of Systems  A 10 point review of systems was completed and negative except what is listed in HPI  Physical Exam  Constitutional:       Appearance: Normal appearance.   HENT:      Mouth/Throat:      Mouth: Mucous membranes are dry.       Pharynx: Oropharynx is clear.   Cardiovascular:      Rate and Rhythm: Rhythm irregularly irregular.   Pulmonary:      Comments: Mildly diminished breath sounds  Abdominal:      General: Bowel sounds are normal.      Palpations: Abdomen is soft.   Musculoskeletal:         General: Normal range of motion.      Cervical back: Normal range of motion.   Skin:     General: Skin is warm and dry.      Capillary Refill: Capillary refill takes less than 2 seconds.   Neurological:      General: No focal deficit present.      Mental Status: He is alert. Mental status is at baseline.   Psychiatric:         Mood and Affect: Mood normal.            Last Recorded Vitals  Blood pressure 113/58, pulse 94, resp. rate (!) 30, height 1.829 m (6'), weight 55.8 kg (123 lb), SpO2 96 %.     Relevant Results  CT head wo IV contrast     Result Date: 2/15/2024  Interpreted By:  Weston Suarez, STUDY: CT HEAD WO IV CONTRAST;  2/15/2024 11:15 pm   INDICATION: Signs/Symptoms:ams.   COMPARISON: None.   ACCESSION NUMBER(S): [id number]   ORDERING CLINICIAN: JENNY MULLER   TECHNIQUE: Axial noncontrast CT images of the head.   FINDINGS: Gray-white matter differentiation is maintained. There are no extra-axial collections. No mass effect or midline shift. There is no hydrocephalus. There is prominence of the bifrontal extra-axial spaces and sylvian fissures which may represent focal cerebral volume loss.   HEMORRHAGE: No acute intracranial hemorrhage.   CALVARIUM: No depressed skull fracture.   EXTRACRANIAL SOFT TISSUES:... Unremarkable.   PARANASAL SINUSES/MASTOIDS: The visualized paranasal sinuses are well aerated. Mastoid air cells are clear.   ORBITS: Grossly normal.        No acute intracranial hemorrhage or acute cortical infarct.     Signed by: Weston Suarez 2/15/2024 11:58 PM Dictation workstation:   [id number]     XR chest 1 view     Result Date: 2/15/2024  Interpreted By:  Weston Suarez, STUDY: XR CHEST 1 VIEW;  2/15/2024 10:18 pm    INDICATION: Signs/Symptoms:ams.   COMPARISON: None.   ACCESSION NUMBER(S): [id number]   ORDERING CLINICIAN: JENNY MULLER   FINDINGS:   CARDIOMEDIASTINAL SILHOUETTE: Cardiomediastinal silhouette is normal in size and configuration taking into account portable technique and patient rotation. There are post CABG changes.   LUNGS/PLEURA: There are no consolidations. There is mild blunting of the costophrenic angles suggestive of small bilateral pleural effusions.. There is no demonstrated pneumothorax.     BONES: No evidence of acute osseous abnormality.        1. Suggestion of small bilateral pleural effusions. No evidence of consolidation.     Signed by: Weston Suarez 2/15/2024 10:54 PM Dictation workstation:   [id number]         Assessment/Plan   Moshe is an 83-year-old male patient who presents to emergency department from skilled nursing facility for altered mental status.  Patient is currently alert and oriented, has a history of dementia.  Patient denies any complaints.  Patient was reportedly hypoxic at the nursing facility.  Patient has a history of A-fib and is on anticoagulation.  EKG in ED showing A-fib with a controlled rate.  Chest x-ray completed showing pleural effusions and interstitial edema without evidence of pneumonia or consolidation.  Antibiotics given in ED but held for admission.  Echocardiogram ordered for the morning as last echocardiogram on file was from 7/27/2021.  BNP elevated in the 700s.  IV fluids held due to elevated BNP, awaiting home medications.  Patient admitted for further medical management.     Acute metabolic encephalopathy/elevated troponin/HONG  See imaging results above  No evidence of pneumonia or consolidation    Hold IV fluids due to fluid overload  Trend troponin  Telemetry monitoring  Repeat labs in a.m.   Daily weight  Intake and output  urinalysis pending     A-fib/hypertension/CAD/BPH/lymphoma/diabetes/ulcerative colitis/GERD  Cardiac/diabetic  diet  ISS  Hypoglycemia protocol  Telemetry monitoring   Continue home Xarelto when med rec is complete  Hold ACEs and ARB's 2/2 HONG     DVT Ppx  SCDs    continue home Xarelto when med rec is complete  PT/OT        I spent 35 minutes in the professional and overall care of this patient.     Patient fully evaluated and plan as above                       Revision History    Patient fully evaluated on February 16.  Continue to recheck urine and ammonia levels.  IV Rocephin given recheck labs in AM.     Patient fully evaluated on February 17.  Slightly more awake and alert.  Continue present antibiotic regimen.  Await final cultures.  Recheck labs in AM.         Hugo Anderson MD

## 2024-02-17 NOTE — NURSING NOTE
Per documentation in pt's paper chart:     Moshe NORMA Velázquez [birth year] is DNR [facility] as of 09/22/2023 witnessed by NARENDRA Fry MD

## 2024-02-18 ENCOUNTER — APPOINTMENT (OUTPATIENT)
Dept: RADIOLOGY | Facility: HOSPITAL | Age: 84
DRG: 682 | End: 2024-02-18
Payer: MEDICARE

## 2024-02-18 LAB
ALBUMIN SERPL BCP-MCNC: 2.6 G/DL (ref 3.4–5)
ALP SERPL-CCNC: 22 U/L (ref 33–136)
ALT SERPL W P-5'-P-CCNC: 22 U/L (ref 10–52)
ANION GAP SERPL CALC-SCNC: 8 MMOL/L (ref 10–20)
AST SERPL W P-5'-P-CCNC: 28 U/L (ref 9–39)
BILIRUB SERPL-MCNC: 1 MG/DL (ref 0–1.2)
BUN SERPL-MCNC: 36 MG/DL (ref 6–23)
CALCIUM SERPL-MCNC: 8.5 MG/DL (ref 8.6–10.3)
CHLORIDE SERPL-SCNC: 100 MMOL/L (ref 98–107)
CO2 SERPL-SCNC: 32 MMOL/L (ref 21–32)
CREAT SERPL-MCNC: 0.82 MG/DL (ref 0.5–1.3)
EGFRCR SERPLBLD CKD-EPI 2021: 87 ML/MIN/1.73M*2
ERYTHROCYTE [DISTWIDTH] IN BLOOD BY AUTOMATED COUNT: 16.6 % (ref 11.5–14.5)
GLUCOSE BLD MANUAL STRIP-MCNC: 100 MG/DL (ref 74–99)
GLUCOSE BLD MANUAL STRIP-MCNC: 135 MG/DL (ref 74–99)
GLUCOSE BLD MANUAL STRIP-MCNC: 89 MG/DL (ref 74–99)
GLUCOSE BLD MANUAL STRIP-MCNC: 91 MG/DL (ref 74–99)
GLUCOSE SERPL-MCNC: 79 MG/DL (ref 74–99)
HCT VFR BLD AUTO: 45.5 % (ref 41–52)
HGB BLD-MCNC: 15.3 G/DL (ref 13.5–17.5)
MCH RBC QN AUTO: 33.4 PG (ref 26–34)
MCHC RBC AUTO-ENTMCNC: 33.6 G/DL (ref 32–36)
MCV RBC AUTO: 99 FL (ref 80–100)
NRBC BLD-RTO: 0 /100 WBCS (ref 0–0)
PLATELET # BLD AUTO: 162 X10*3/UL (ref 150–450)
POTASSIUM SERPL-SCNC: 4.3 MMOL/L (ref 3.5–5.3)
PROT SERPL-MCNC: 4.5 G/DL (ref 6.4–8.2)
RBC # BLD AUTO: 4.58 X10*6/UL (ref 4.5–5.9)
SODIUM SERPL-SCNC: 136 MMOL/L (ref 136–145)
WBC # BLD AUTO: 5.2 X10*3/UL (ref 4.4–11.3)

## 2024-02-18 PROCEDURE — 2500000004 HC RX 250 GENERAL PHARMACY W/ HCPCS (ALT 636 FOR OP/ED): Performed by: NURSE PRACTITIONER

## 2024-02-18 PROCEDURE — 80053 COMPREHEN METABOLIC PANEL: CPT | Performed by: INTERNAL MEDICINE

## 2024-02-18 PROCEDURE — 71045 X-RAY EXAM CHEST 1 VIEW: CPT

## 2024-02-18 PROCEDURE — A4216 STERILE WATER/SALINE, 10 ML: HCPCS

## 2024-02-18 PROCEDURE — 2500000004 HC RX 250 GENERAL PHARMACY W/ HCPCS (ALT 636 FOR OP/ED)

## 2024-02-18 PROCEDURE — 82947 ASSAY GLUCOSE BLOOD QUANT: CPT

## 2024-02-18 PROCEDURE — 2500000001 HC RX 250 WO HCPCS SELF ADMINISTERED DRUGS (ALT 637 FOR MEDICARE OP): Performed by: INTERNAL MEDICINE

## 2024-02-18 PROCEDURE — 71045 X-RAY EXAM CHEST 1 VIEW: CPT | Performed by: RADIOLOGY

## 2024-02-18 PROCEDURE — C9113 INJ PANTOPRAZOLE SODIUM, VIA: HCPCS | Performed by: NURSE PRACTITIONER

## 2024-02-18 PROCEDURE — 85027 COMPLETE CBC AUTOMATED: CPT | Performed by: INTERNAL MEDICINE

## 2024-02-18 PROCEDURE — 2500000004 HC RX 250 GENERAL PHARMACY W/ HCPCS (ALT 636 FOR OP/ED): Performed by: INTERNAL MEDICINE

## 2024-02-18 PROCEDURE — 36415 COLL VENOUS BLD VENIPUNCTURE: CPT | Performed by: INTERNAL MEDICINE

## 2024-02-18 PROCEDURE — 1200000002 HC GENERAL ROOM WITH TELEMETRY DAILY

## 2024-02-18 RX ORDER — SODIUM CHLORIDE 9 MG/ML
INJECTION, SOLUTION INTRAMUSCULAR; INTRAVENOUS; SUBCUTANEOUS
Status: COMPLETED
Start: 2024-02-18 | End: 2024-02-18

## 2024-02-18 RX ORDER — METOPROLOL TARTRATE 1 MG/ML
5 INJECTION, SOLUTION INTRAVENOUS ONCE
Status: COMPLETED | OUTPATIENT
Start: 2024-02-18 | End: 2024-02-19

## 2024-02-18 RX ORDER — MIDODRINE HYDROCHLORIDE 2.5 MG/1
5 TABLET ORAL 3 TIMES DAILY
Status: DISCONTINUED | OUTPATIENT
Start: 2024-02-18 | End: 2024-02-21

## 2024-02-18 RX ORDER — MIRTAZAPINE 15 MG/1
15 TABLET, FILM COATED ORAL NIGHTLY
Status: DISCONTINUED | OUTPATIENT
Start: 2024-02-18 | End: 2024-02-22 | Stop reason: HOSPADM

## 2024-02-18 RX ADMIN — DEXTROSE AND SODIUM CHLORIDE 100 ML/HR: 5; 450 INJECTION, SOLUTION INTRAVENOUS at 05:37

## 2024-02-18 RX ADMIN — MIDODRINE HYDROCHLORIDE 5 MG: 2.5 TABLET ORAL at 15:25

## 2024-02-18 RX ADMIN — PANTOPRAZOLE SODIUM 40 MG: 40 INJECTION, POWDER, FOR SOLUTION INTRAVENOUS at 21:10

## 2024-02-18 RX ADMIN — MIRTAZAPINE 15 MG: 15 TABLET, FILM COATED ORAL at 21:09

## 2024-02-18 RX ADMIN — PANTOPRAZOLE SODIUM 40 MG: 40 INJECTION, POWDER, FOR SOLUTION INTRAVENOUS at 09:10

## 2024-02-18 RX ADMIN — DEXTROSE AND SODIUM CHLORIDE 100 ML/HR: 5; 450 INJECTION, SOLUTION INTRAVENOUS at 15:30

## 2024-02-18 RX ADMIN — SODIUM CHLORIDE 10 ML: 9 INJECTION, SOLUTION INTRAMUSCULAR; INTRAVENOUS; SUBCUTANEOUS at 09:10

## 2024-02-18 RX ADMIN — CEFTRIAXONE SODIUM 1 G: 1 INJECTION, SOLUTION INTRAVENOUS at 15:26

## 2024-02-18 RX ADMIN — MIDODRINE HYDROCHLORIDE 5 MG: 2.5 TABLET ORAL at 21:09

## 2024-02-18 ASSESSMENT — COGNITIVE AND FUNCTIONAL STATUS - GENERAL
DAILY ACTIVITIY SCORE: 16
TURNING FROM BACK TO SIDE WHILE IN FLAT BAD: A LOT
PERSONAL GROOMING: A LITTLE
WALKING IN HOSPITAL ROOM: A LOT
CLIMB 3 TO 5 STEPS WITH RAILING: A LOT
DRESSING REGULAR LOWER BODY CLOTHING: A LOT
EATING MEALS: A LITTLE
HELP NEEDED FOR BATHING: A LOT
DRESSING REGULAR UPPER BODY CLOTHING: A LITTLE
TOILETING: A LITTLE
MOVING FROM LYING ON BACK TO SITTING ON SIDE OF FLAT BED WITH BEDRAILS: A LOT
MOVING TO AND FROM BED TO CHAIR: A LOT
MOBILITY SCORE: 12
STANDING UP FROM CHAIR USING ARMS: A LOT

## 2024-02-18 ASSESSMENT — PAIN - FUNCTIONAL ASSESSMENT
PAIN_FUNCTIONAL_ASSESSMENT: 0-10
PAIN_FUNCTIONAL_ASSESSMENT: 0-10

## 2024-02-18 ASSESSMENT — PAIN SCALES - GENERAL
PAINLEVEL_OUTOF10: 0 - NO PAIN
PAINLEVEL_OUTOF10: 0 - NO PAIN

## 2024-02-18 NOTE — PROGRESS NOTES
Victor M Mathias is a 83 y.o. male on day 2 of admission presenting with Acute metabolic encephalopathy.      Subjective   Patient fully evaluated on February 16 and clinically improved still slightly confused       Objective     Last Recorded Vitals  BP 97/55 (BP Location: Right arm, Patient Position: Sitting)   Pulse 100   Temp 36.5 °C (97.7 °F) (Temporal)   Resp 18   Wt 48.5 kg (106 lb 14.8 oz)   SpO2 96%   Intake/Output last 3 Shifts:    Intake/Output Summary (Last 24 hours) at 2/18/2024 1440  Last data filed at 2/18/2024 1006  Gross per 24 hour   Intake 3800.83 ml   Output 600 ml   Net 3200.83 ml         Admission Weight  Weight: 55.8 kg (123 lb) (02/15/24 2200)    Daily Weight  02/18/24 : 48.5 kg (106 lb 14.8 oz)    Image Results  ECG 12 lead  Atrial fibrillation  Anteroseptal infarct, old  Repol abnrm suggests ischemia, diffuse leads    See ED provider note for full interpretation and clinical correlation  Confirmed by Charisma Díaz ([id number]) on 2/16/2024 5:30:36 PM  Transthoracic Echo (TTE) [facility], [address] [place] [id number] [phone] and                                  Fax [phone]    TRANSTHORACIC ECHOCARDIOGRAM REPORT       Patient Name:      VICTOR M MATHIAS       Reading Physician:    [id number] Kael Real MD  Study Date:        2/16/2024            Ordering Provider:    [id number] REJI WASHINGTON  MRN/PID:           [id number]             Fellow:  Accession#:        [id number]         Nurse:                Grace Mtaamoros RN  Date of Birth/Age: 1940 / 83 years Sonographer:          Lorna Olivares RDCS  Gender:            M                    Additional Staff:  Height:            182.88 cm             Admit Date:           2/16/2024  Weight:            55.79 kg             Admission Status:     Inpatient -                                                                Routine  BSA / BMI:         1.73 m2 / 16.68      Encounter#:           [id number]                     kg/m2                                          Department Location:  [facility]  Blood Pressure: 112 /71 mmHg    Study Type:    TRANSTHORACIC ECHO (TTE) COMPLETE  Diagnosis/ICD: Other fluid overload-E87.79  Indication:    A-Fib AMS  CPT Code:      Echo Complete w Full Doppler-53497    Patient History:  Pertinent History: CAD, PVD, A-Fib, HTN and Hyperlipidemia. Hx CABG, DM.    Study Detail: The following Echo studies were performed: 2D, M-Mode, Doppler and                color flow. Technically challenging study due to patient lying in                supine position and body habitus. Definity used as a contrast                agent for endocardial border definition. Total contrast used for                this procedure was 2 mL via IV push.       PHYSICIAN INTERPRETATION:  Left Ventricle: The left ventricular systolic function is moderately decreased, with an estimated ejection fraction of 30-35%. The patient is in atrial fibrillation which may influence the estimate of left ventricular function and transvalvular flows. There is global hypokinesis of the left ventricle with minor regional variations. The left ventricular cavity size is normal. Left ventricular diastolic filling was indeterminate. Distal LV septum and LV apex are hypokinetic.  Left Atrium: The left atrium is moderately dilated.  Right Ventricle: The right ventricle is normal in size. There is reduced right ventricular systolic function.  Right Atrium: The right atrium is mildly dilated.  Aortic Valve: The aortic valve is trileaflet. There is mild aortic valve cusp calcification. There is mild aortic valve thickening. There is trace to mild aortic valve regurgitation. The  peak instantaneous gradient of the aortic valve is 5.3 mmHg.  Mitral Valve: The mitral valve is mildly thickened. The mitral valve spectral Doppler A-wave is absent, consistent with atrial fibrillation. There is mild mitral annular calcification. There is mild mitral valve regurgitation.  Tricuspid Valve: The tricuspid valve is structurally normal. There is mild tricuspid regurgitation. The Doppler estimated RVSP is slightly elevated at 31.4 mmHg.  Pulmonic Valve: The pulmonic valve is structurally normal. There is physiologic pulmonic valve regurgitation.  Pericardium: There is no pericardial effusion noted.  Aorta: The aortic root is normal. There is upper limits of normal dilatation of the ascending aorta.  Systemic Veins: The inferior vena cava appears to be of normal size.  In comparison to the previous echocardiogram(s): There are no prior studies on this patient for comparison purposes.       CONCLUSIONS:   1. Left ventricular systolic function is moderately decreased with a 30-35% estimated ejection fraction.   2. There is global hypokinesis of the left ventricle with minor regional variations.   3. Distal LV septum and LV apex are hypokinetic.   4. The patient is in atrial fibrillation which may influence the estimate of left ventricular function and transvalvular flows.   5. The left atrium is moderately dilated.   6. Absent A-wave on MV spectral Doppler tracing, consistent with atrial fibrillation.   7. There is reduced right ventricular systolic function.   8. Slightly elevated RVSP.    QUANTITATIVE DATA SUMMARY:  2D MEASUREMENTS:                            Normal Ranges:  Ao Root d:     3.60 cm    (2.0-3.7cm)  LAs:           5.10 cm    (2.7-4.0cm)  IVSd:          1.19 cm    (0.6-1.1cm)  LVPWd:         1.11 cm    (0.6-1.1cm)  LVIDd:         4.80 cm    (3.9-5.9cm)  LVIDs:         4.07 cm  LV Mass Index: 119.1 g/m2  LV % FS        15.2 %    LA VOLUME:                                Normal Ranges:  LA Vol  A4C:        69.2 ml    (22+/-6mL/m2)  LA Vol A2C:        91.1 ml  LA Vol BP:         81.8 ml  LA Vol Index A4C:  39.9ml/m2  LA Vol Index A2C:  52.6 ml/m2  LA Vol Index BP:   47.2 ml/m2  LA Area A4C:       23.7 cm2  LA Area A2C:       26.4 cm2  LA Major Axis A4C: 6.9 cm  LA Major Axis A2C: 6.5 cm  LA Volume Index:   43.4 ml/m2  LA Vol A4C:        64.0 ml  LA Vol A2C:        84.8 ml    RA VOLUME BY A/L METHOD:                        Normal Ranges:  RA Area A4C: 19.0 cm2    M-MODE MEASUREMENTS:                   Normal Ranges:  Ao Root: 3.80 cm (2.0-3.7cm)  LAs:     4.50 cm (2.7-4.0cm)    AORTA MEASUREMENTS:                     Normal Ranges:  Asc Ao, d: 3.80 cm (2.1-3.4cm)    LV SYSTOLIC FUNCTION BY 2D PLANIMETRY (MOD):                      Normal Ranges:  EF-A4C View: 32.6 % (>=55%)  EF-A2C View: 48.8 %  EF-Biplane:  37.9 %    AORTIC VALVE:                          Normal Ranges:  AoV Vmax:      1.15 m/s (<=1.7m/s)  AoV Peak P.3 mmHg (<20mmHg)  LVOT Max Rob:  0.49 m/s (<=1.1m/s)  LVOT VTI:      6.87 cm  LVOT Diameter: 2.20 cm  (1.8-2.4cm)  AoV Area,Vmax: 1.63 cm2 (2.5-4.5cm2)    AORTIC INSUFFICIENCY:  AI Vmax:       2.39 m/s  AI Half-time:  420 msec  AI Decel Rate: 167.00 cm/s2       RIGHT VENTRICLE:  RV Basal 3.51 cm  RV Mid   1.90 cm  RV Major 7.6 cm  TAPSE:   12.2 mm  RV s'    0.08 m/s    TRICUSPID VALVE/RVSP:                              Normal Ranges:  Peak TR Velocity: 2.57 m/s  RV Syst Pressure: 31.4 mmHg (< 30mmHg)  IVC Diam:         1.64 cm       [id number] Kael Real MD  Electronically signed on 2024 at 4:48:12 PM       ** Final **      Physical Exam    Relevant Results               Assessment/Plan                  Principal Problem:    Acute metabolic encephalopathy          Hugo Anderson MD  Physician  Internal Medicine     H&P      Addendum     Date of Service: 2/15/2024  9:54 PM     Addendum       Expand All Collapse All    History Of Present Illness  Moshe Velázquez is a 83 y.o. male  presenting to emergency department for evaluation of altered mental status.  Per skilled nursing facility he has been altered yesterday and was transiently hypoxic.  Patient has a history of dementia.  Patient is currently alert and oriented x 3 and denies any complaints.  Patient denies recent illness, nausea, vomiting, diarrhea.  Patient denies chest pain or dizziness.     In ED, EKG completed showing A-fib at a rate of 83 without ST elevation or depression per ER physician review.  CT imaging of the head completed and negative for acute process.  Chest x-ray completed showing right-sided interstitial disease, small bilateral pleural effusion, no evidence of pneumonia or consolidation.  Urinalysis pending patient started on IV antibiotics in ED.  Blood cultures pending.  Glucose 109, bicarb 34, BUN 59, creatinine 1.36, GFR 52, alk phos 32, bilirubin 1.6, troponin 31, 27.  , PT 15.6, INR 1.4.  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  Blood pressure 113/58, heart rate 94, respirations 30, temperature 37.5 °C, SpO2 96% on supplemental oxygen.  Last echocardiogram on file 7/27/2021 with an EF of 50 to 55%.  Patient admitted to telemetry under the care of Dr. Anderson who will continue to follow.  I was asked to do H&P and place initial admission orders.     Past Medical History  Diverticulosis, internal hemorrhoids, sinusitis, PE, DVT, A-fib on anticoagulation, diabetes, hypertension, CAD, BPH, lymphoma, GERD, ulcerative colitis, CHF        Surgical History  Tonsillectomy, cholecystectomy, left knee surgery, bowel resection, appendectomy, cardiac catheterization, colonoscopy, CABG        Social History  Former smoker, no drug use, social alcohol use  Family History  Reviewed and noncontributory     Allergies  Dilaudid [hydromorphone]     Review of Systems  A 10 point review of systems was completed and negative except what is listed in HPI  Physical Exam  Constitutional:        Appearance: Normal appearance.   HENT:      Mouth/Throat:      Mouth: Mucous membranes are dry.      Pharynx: Oropharynx is clear.   Cardiovascular:      Rate and Rhythm: Rhythm irregularly irregular.   Pulmonary:      Comments: Mildly diminished breath sounds  Abdominal:      General: Bowel sounds are normal.      Palpations: Abdomen is soft.   Musculoskeletal:         General: Normal range of motion.      Cervical back: Normal range of motion.   Skin:     General: Skin is warm and dry.      Capillary Refill: Capillary refill takes less than 2 seconds.   Neurological:      General: No focal deficit present.      Mental Status: He is alert. Mental status is at baseline.   Psychiatric:         Mood and Affect: Mood normal.            Last Recorded Vitals  Blood pressure 113/58, pulse 94, resp. rate (!) 30, height 1.829 m (6'), weight 55.8 kg (123 lb), SpO2 96 %.     Relevant Results  CT head wo IV contrast     Result Date: 2/15/2024  Interpreted By:  Weston Suarez, STUDY: CT HEAD WO IV CONTRAST;  2/15/2024 11:15 pm   INDICATION: Signs/Symptoms:ams.   COMPARISON: None.   ACCESSION NUMBER(S): [id number]   ORDERING CLINICIAN: JENNY MULLER   TECHNIQUE: Axial noncontrast CT images of the head.   FINDINGS: Gray-white matter differentiation is maintained. There are no extra-axial collections. No mass effect or midline shift. There is no hydrocephalus. There is prominence of the bifrontal extra-axial spaces and sylvian fissures which may represent focal cerebral volume loss.   HEMORRHAGE: No acute intracranial hemorrhage.   CALVARIUM: No depressed skull fracture.   EXTRACRANIAL SOFT TISSUES:... Unremarkable.   PARANASAL SINUSES/MASTOIDS: The visualized paranasal sinuses are well aerated. Mastoid air cells are clear.   ORBITS: Grossly normal.        No acute intracranial hemorrhage or acute cortical infarct.     Signed by: Weston Suarez 2/15/2024 11:58 PM Dictation workstation:   [id number]     XR chest 1 view     Result  Date: 2/15/2024  Interpreted By:  Weston Suarez, STUDY: XR CHEST 1 VIEW;  2/15/2024 10:18 pm   INDICATION: Signs/Symptoms:ams.   COMPARISON: None.   ACCESSION NUMBER(S): [id number]   ORDERING CLINICIAN: JENNY MULLER   FINDINGS:   CARDIOMEDIASTINAL SILHOUETTE: Cardiomediastinal silhouette is normal in size and configuration taking into account portable technique and patient rotation. There are post CABG changes.   LUNGS/PLEURA: There are no consolidations. There is mild blunting of the costophrenic angles suggestive of small bilateral pleural effusions.. There is no demonstrated pneumothorax.     BONES: No evidence of acute osseous abnormality.        1. Suggestion of small bilateral pleural effusions. No evidence of consolidation.     Signed by: Weston Suarez 2/15/2024 10:54 PM Dictation workstation:   [id number]         Assessment/Plan   Moshe is an 83-year-old male patient who presents to emergency department from skilled nursing facility for altered mental status.  Patient is currently alert and oriented, has a history of dementia.  Patient denies any complaints.  Patient was reportedly hypoxic at the nursing facility.  Patient has a history of A-fib and is on anticoagulation.  EKG in ED showing A-fib with a controlled rate.  Chest x-ray completed showing pleural effusions and interstitial edema without evidence of pneumonia or consolidation.  Antibiotics given in ED but held for admission.  Echocardiogram ordered for the morning as last echocardiogram on file was from 7/27/2021.  BNP elevated in the 700s.  IV fluids held due to elevated BNP, awaiting home medications.  Patient admitted for further medical management.     Acute metabolic encephalopathy/elevated troponin/HONG  See imaging results above  No evidence of pneumonia or consolidation    Hold IV fluids due to fluid overload  Trend troponin  Telemetry monitoring  Repeat labs in a.m.   Daily weight  Intake and output  urinalysis pending      A-fib/hypertension/CAD/BPH/lymphoma/diabetes/ulcerative colitis/GERD  Cardiac/diabetic diet  ISS  Hypoglycemia protocol  Telemetry monitoring   Continue home Xarelto when med rec is complete  Hold ACEs and ARB's 2/2 HONG     DVT Ppx  SCDs    continue home Xarelto when med rec is complete  PT/OT        I spent 35 minutes in the professional and overall care of this patient.     Patient fully evaluated and plan as above                       Revision History    Patient fully evaluated on February 16.  Continue to recheck urine and ammonia levels.  IV Rocephin given recheck labs in AM.     Patient fully evaluated on February 17.  Slightly more awake and alert.  Continue present antibiotic regimen.  Await final cultures.  Recheck labs in AM.     Patient fully evaluated on February 18.  Continue present antibiotic regimen.  Slightly more animated.  Oral intake is marginal and recheck labs in AM.  Remeron added for appetite stimulant.    Hugo Anderson MD

## 2024-02-19 ENCOUNTER — APPOINTMENT (OUTPATIENT)
Dept: RADIOLOGY | Facility: HOSPITAL | Age: 84
DRG: 682 | End: 2024-02-19
Payer: MEDICARE

## 2024-02-19 LAB
ALBUMIN SERPL BCP-MCNC: 3 G/DL (ref 3.4–5)
ALP SERPL-CCNC: 29 U/L (ref 33–136)
ALT SERPL W P-5'-P-CCNC: 26 U/L (ref 10–52)
ANION GAP SERPL CALC-SCNC: 8 MMOL/L (ref 10–20)
AST SERPL W P-5'-P-CCNC: 29 U/L (ref 9–39)
BILIRUB SERPL-MCNC: 1.3 MG/DL (ref 0–1.2)
BUN SERPL-MCNC: 23 MG/DL (ref 6–23)
CALCIUM SERPL-MCNC: 9.2 MG/DL (ref 8.6–10.3)
CHLORIDE SERPL-SCNC: 101 MMOL/L (ref 98–107)
CO2 SERPL-SCNC: 35 MMOL/L (ref 21–32)
CREAT SERPL-MCNC: 0.76 MG/DL (ref 0.5–1.3)
EGFRCR SERPLBLD CKD-EPI 2021: 89 ML/MIN/1.73M*2
ERYTHROCYTE [DISTWIDTH] IN BLOOD BY AUTOMATED COUNT: 16.3 % (ref 11.5–14.5)
GLUCOSE BLD MANUAL STRIP-MCNC: 101 MG/DL (ref 74–99)
GLUCOSE BLD MANUAL STRIP-MCNC: 130 MG/DL (ref 74–99)
GLUCOSE BLD MANUAL STRIP-MCNC: 131 MG/DL (ref 74–99)
GLUCOSE SERPL-MCNC: 95 MG/DL (ref 74–99)
HCT VFR BLD AUTO: 47.2 % (ref 41–52)
HGB BLD-MCNC: 15.9 G/DL (ref 13.5–17.5)
MCH RBC QN AUTO: 32.8 PG (ref 26–34)
MCHC RBC AUTO-ENTMCNC: 33.7 G/DL (ref 32–36)
MCV RBC AUTO: 97 FL (ref 80–100)
NRBC BLD-RTO: 0 /100 WBCS (ref 0–0)
PLATELET # BLD AUTO: 178 X10*3/UL (ref 150–450)
POTASSIUM SERPL-SCNC: 4.2 MMOL/L (ref 3.5–5.3)
PROT SERPL-MCNC: 4.9 G/DL (ref 6.4–8.2)
RBC # BLD AUTO: 4.85 X10*6/UL (ref 4.5–5.9)
SODIUM SERPL-SCNC: 140 MMOL/L (ref 136–145)
WBC # BLD AUTO: 6.2 X10*3/UL (ref 4.4–11.3)

## 2024-02-19 PROCEDURE — 2550000001 HC RX 255 CONTRASTS: Performed by: INTERNAL MEDICINE

## 2024-02-19 PROCEDURE — 2500000004 HC RX 250 GENERAL PHARMACY W/ HCPCS (ALT 636 FOR OP/ED): Performed by: INTERNAL MEDICINE

## 2024-02-19 PROCEDURE — 80053 COMPREHEN METABOLIC PANEL: CPT | Performed by: INTERNAL MEDICINE

## 2024-02-19 PROCEDURE — 2500000004 HC RX 250 GENERAL PHARMACY W/ HCPCS (ALT 636 FOR OP/ED): Performed by: NURSE PRACTITIONER

## 2024-02-19 PROCEDURE — 2500000001 HC RX 250 WO HCPCS SELF ADMINISTERED DRUGS (ALT 637 FOR MEDICARE OP): Performed by: INTERNAL MEDICINE

## 2024-02-19 PROCEDURE — 2500000005 HC RX 250 GENERAL PHARMACY W/O HCPCS

## 2024-02-19 PROCEDURE — 85027 COMPLETE CBC AUTOMATED: CPT | Performed by: INTERNAL MEDICINE

## 2024-02-19 PROCEDURE — 71260 CT THORAX DX C+: CPT | Performed by: RADIOLOGY

## 2024-02-19 PROCEDURE — 82947 ASSAY GLUCOSE BLOOD QUANT: CPT

## 2024-02-19 PROCEDURE — 36415 COLL VENOUS BLD VENIPUNCTURE: CPT | Performed by: INTERNAL MEDICINE

## 2024-02-19 PROCEDURE — C9113 INJ PANTOPRAZOLE SODIUM, VIA: HCPCS | Performed by: NURSE PRACTITIONER

## 2024-02-19 PROCEDURE — 1100000001 HC PRIVATE ROOM DAILY

## 2024-02-19 PROCEDURE — 71260 CT THORAX DX C+: CPT

## 2024-02-19 PROCEDURE — 92610 EVALUATE SWALLOWING FUNCTION: CPT | Mod: GN

## 2024-02-19 RX ORDER — CEFUROXIME AXETIL 250 MG/1
250 TABLET ORAL 2 TIMES DAILY
Status: DISCONTINUED | OUTPATIENT
Start: 2024-02-19 | End: 2024-02-22 | Stop reason: HOSPADM

## 2024-02-19 RX ADMIN — CEFUROXIME AXETIL 250 MG: 250 TABLET, FILM COATED ORAL at 21:28

## 2024-02-19 RX ADMIN — MIRTAZAPINE 15 MG: 15 TABLET, FILM COATED ORAL at 21:28

## 2024-02-19 RX ADMIN — DEXTROSE AND SODIUM CHLORIDE 100 ML/HR: 5; 450 INJECTION, SOLUTION INTRAVENOUS at 02:22

## 2024-02-19 RX ADMIN — MIDODRINE HYDROCHLORIDE 5 MG: 2.5 TABLET ORAL at 15:25

## 2024-02-19 RX ADMIN — DEXTROSE AND SODIUM CHLORIDE 100 ML/HR: 5; 450 INJECTION, SOLUTION INTRAVENOUS at 12:19

## 2024-02-19 RX ADMIN — PANTOPRAZOLE SODIUM 40 MG: 40 INJECTION, POWDER, FOR SOLUTION INTRAVENOUS at 21:28

## 2024-02-19 RX ADMIN — IOHEXOL 75 ML: 350 INJECTION, SOLUTION INTRAVENOUS at 20:01

## 2024-02-19 RX ADMIN — METOPROLOL TARTRATE 5 MG: 1 INJECTION, SOLUTION INTRAVENOUS at 00:02

## 2024-02-19 RX ADMIN — MIDODRINE HYDROCHLORIDE 5 MG: 2.5 TABLET ORAL at 09:40

## 2024-02-19 RX ADMIN — MIDODRINE HYDROCHLORIDE 5 MG: 2.5 TABLET ORAL at 21:28

## 2024-02-19 RX ADMIN — PANTOPRAZOLE SODIUM 40 MG: 40 INJECTION, POWDER, FOR SOLUTION INTRAVENOUS at 10:21

## 2024-02-19 ASSESSMENT — COGNITIVE AND FUNCTIONAL STATUS - GENERAL
TURNING FROM BACK TO SIDE WHILE IN FLAT BAD: A LOT
HELP NEEDED FOR BATHING: A LOT
DRESSING REGULAR LOWER BODY CLOTHING: A LOT
WALKING IN HOSPITAL ROOM: A LITTLE
EATING MEALS: A LOT
MOVING FROM LYING ON BACK TO SITTING ON SIDE OF FLAT BED WITH BEDRAILS: A LITTLE
PERSONAL GROOMING: A LOT
DRESSING REGULAR UPPER BODY CLOTHING: A LOT
CLIMB 3 TO 5 STEPS WITH RAILING: A LITTLE
MOVING TO AND FROM BED TO CHAIR: A LITTLE
TOILETING: A LOT
MOBILITY SCORE: 16
DAILY ACTIVITIY SCORE: 12
STANDING UP FROM CHAIR USING ARMS: A LOT

## 2024-02-19 ASSESSMENT — PAIN SCALES - GENERAL
PAINLEVEL_OUTOF10: 0 - NO PAIN
PAINLEVEL_OUTOF10: 2

## 2024-02-19 ASSESSMENT — PAIN - FUNCTIONAL ASSESSMENT
PAIN_FUNCTIONAL_ASSESSMENT: 0-10
PAIN_FUNCTIONAL_ASSESSMENT: 0-10

## 2024-02-19 NOTE — PROGRESS NOTES
02/19/24 0942   Discharge Planning   Living Arrangements Alone;Other (Comment)  (Lived alone prior to going to [facility])   Support Systems Children   Patient expects to be discharged to: SNF   Does the patient need discharge transport arranged? Yes   RoundTrip coordination needed? Yes     I met with patient at his bedside.  He presented from [facility] where he reported he has been for 15 days.  He reported that facility told him he would start having to pay out of pocket if he were to remain there.  Patient most likely will need further therapies, will not be safe to return home, awaiting [facility] score and PT/OT recommendations.  Referral sent to [facility] to determine patient's financial abilities to return.  Care Coordination team following for assistance with discharge planning.  Kaz REID TCC  [id number]:  Patient is LTC bedhold at [facility].  JACE REID TCC

## 2024-02-19 NOTE — CONSULTS
Nutrition Note  Reason for Assessment  Reason for Assessment: Admission nursing screening (Wounds)     Nutrition Assessment    Nutrition History:  Energy Intake: Poor < 50 % (NPO)  Food and Nutrient History: Pt was able to provide little history, reports that his appetite is poor PTA. SLP following patient and recommending a pureed, thin liquid diet. Per SLP note patient will eat 2-3 bites and feel full. Remeron was added to increase appetite.  Vitamin/Herbal Supplement Use: MVI    Difficulty chewing: FRANCES  Difficulty swallowing: Yes per SLP note    PMH, meds, and labs reviewed.  Dietary Orders (From admission, onward)       Start     Ordered    02/18/24 2317  NPO Diet; Effective now  Diet effective now         02/18/24 2316                  Independent after set-up    GI per flowsheet:  Gastrointestinal  Gastrointestinal (WDL): Within Defined Limits  Abdomen Inspection: Soft, Flat, Nondistended  Abdominal Tenderness: Soft  Bowel Sounds: All quadrants  Bowel Sounds (All Quadrants): Present, Hypoactive  Passing Flatus: Yes  Last BM Date: 02/16/24  Bowel Incontinence: No  Last bowel movement documented: 02/16/24  Allergies: Dilaudid [hydromorphone]     Anthropometrics:  Height: 182.9 cm (6')  Weight: 50.5 kg (111 lb 5.3 oz)  BMI (Calculated): 15.1  IBW: 80.9 kg      Weight History / % Weight Change: Weights per EMR x 1 year: 1/9: 78.9kg, 2/13: 78.9kg, 4/5: 76.7kg, 9/8: 72.6kg, 4/10: 75.8kg, 12/13: 70.9kg, 1/11: 61.7kg, 1/24: 55.8kg, 2/18: 48.5kg   Interpretation of Weight Loss:  (36% wt loss x 11 months, 28.7% wt loss x 2 months)               Estimated Nutritional Needs:  Method for Estimating Needs: 1515-1767kcals (30-35kcals/kg ABW)    Method for Estimating Needs: 60-75g (1.2-1.5kcals/kg ABW)    Method for Estimating Needs: 1ml/kcal or per MD    Nutrition Focused Physical Findings:   Orbital Fat Pads: Severe (dark circles, hollowing and loose skin)  Buccal Fat Pads: Severe (hollow, sunken and narrow face)  Triceps:  Severe (negligible fat tissue)  Ribs: Defer    Temporalis: Severe (hollowed scooping depression)  Pectoralis (Clavicular Region): Severe (protruding prominent clavicle)  Deltoid/Trapezius: Severe (squared shoulders, acromion process prominent)  Interosseous: Mild-Moderate (slightly depressed area between thumb and forefinger)  Trapezius/Infraspinatus/Supraspinatus (Scapular Region): Severe (prominent visual scapula, depression between ribs, scapula or shoulder)  Quadriceps: Defer  Gastrocnemius: Defer    Edema  Edema Location: BLE per nursing assessment    Skin: Positive (stage 2 on coccyx)  Pain Score: 0 - No pain     Nutrition Diagnosis   Patient has Malnutrition Diagnosis: Yes  Diagnosis Status: New  Malnutrition Diagnosis: Severe malnutrition related to chronic disease or condition  As Evidenced by: severe fat loss (orbital, buccal, triceps) and severe muscle wasting (temporalis, clavicle, deltoid, scapular)  Additional Assessment Information: diverticulosis, bowel resection    Patient has Nutrition Diagnosis: Yes  New  Nutrition Diagnosis 1: Increased nutrient needs  Related to (1): wound healing  As Evidenced by (1): stage 2 pressure injury         Nutrition Interventions/Recommendations   Interventions        Interventions: Meals and snacks  Goal: NPO status: recommend pureed, thin liquids per SLP recommendation. Will add ONS once no longer NPO      Nutrition Monitoring and Evaluation   Body Composition/Growth/Weight History  Monitoring and Evaluation Plan: Weight  Weight: Measured weight  Criteria: reweigh at least every 5 days  Food/Nutrient Related History Monitoring  Monitoring and Evaluation Plan: Energy intake  Energy Intake: Estimated energy intake  Criteria: Advancements in diet          Time Spent (min): 60 minutes  Last Date of Nutrition Visit: 02/19/24  Nutrition Follow-Up Needed?: 3-5 days

## 2024-02-19 NOTE — PROGRESS NOTES
Speech-Language Pathology    Inpatient Speech-Language Pathology Clinical Swallow Evaluation    Patient Name: Moshe Velázquez  MRN: [id number]  Today's Date: 2/19/2024   Time Calculation  Start Time: 0842  Stop Time: 0857  Time Calculation (min): 15 min     Current Problem:   1. Acute metabolic encephalopathy        2. Altered mental status, unspecified altered mental status type        3. HONG (acute kidney injury) (CMS/HCC)        4. Elevated troponin        5. Pneumonia due to infectious organism, unspecified laterality, unspecified part of lung        6. Other fluid overload  Transthoracic Echo (TTE) Complete        Recommendations:  Additional Recommendations: Dysphagia treatment, Nutrition  Solid Diet Recommendations : Pureed/extremely thick  (IDDSI Level 4) (No mixed consistencies)  Liquid Diet Recommendations: Thin (IDDSI Level 0)  Compensatory Swallowing Strategies: Small bites/sips, Eat/feed slowly, Single sips, Alternate solids and liquids, Upright 90 degrees as possible for all oral intake, Swallow 2 times per bite/sip  Medication Administration Recommendations: Crushed, With Pureed  Follow up treatments: Diet tolerance monitoring, Patient/family education    Dysphagia Goals:   Patient will tolerate recommended diet without observed clinical signs of aspiration. GOAL STARTED 2/19.   Pt/family education     Assessment:  Assessment Results: Patient presents with moderate oral dysphagia secondary to ill-fitting full dentures, and suspect mild pharyngeal dysphagia, however swallow appears safe to initiate a modified oral diet given implementation of swallow controls.  Prognosis:  (Feel pt is safe to initiate modified oral diet, yet question adequate oral intake; pt confirms only taking 2-3 bites before feeling full. Absent appetite)  Medical Staff Made Aware: Yes  Barriers: Cognition, Comorbidities    Plan:  Inpatient/Swing Bed or Outpatient: Inpatient  Treatment/Interventions: Assess diet tolerance, Bolus  trials, Patient/family education  SLP Plan: Skilled SLP  SLP Frequency: One time visit  Duration: 1 week  SLP Discharge Recommendations:  (will continue to assess while in-house)  Diet Recommendations: Solid, Liquid  Solid Consistency: Pureed/extremely thick (IDDSI Level 4) (No mixed consistencies)  Liquid Consistency: Thin (IDDSI Level 0)  Discussed POC: Patient, Nursing  Discussed Risks/Benefits: Yes  Patient/Caregiver Agreeable: Yes    Subjective   Current Problem:  Clinical swallow evaluation order placed to rule out aspiration.    General Visit Information:  Patient Class: Inpatient  Referred By: Farhana Valdivia  Past Medical History Relevant to Rehab: Diverticulosis, internal hemorrhoids, sinusitis, PE, DVT, A-fib on anticoagulation, diabetes, hypertension, CAD, BPH, lymphoma, GERD, ulcerative colitis, CHF. Pt admitted from SNF with AMS and hypoxia.  Patient Seen During This Visit: Yes  Prior to Session Communication: Bedside nurse (Nelli)  [employer] Diet: Mech Soft/Thin Liquids at SNF  Current Diet : NPO (Night shift NSG reports coughing with p.o.)  Dysphagia Diagnosis: Moderate oral stage dysphagia (Suspect mild pharyngeal dysphagia)    Vital Signs: Afebrile. 95% SPO2 on nasal cannula. WBC WNL (5.2).     CT Head 2/15: Negative for acute process    CxR 2/18: Interval development of opacity in the right mid and lower lung which  may correspond to pneumonia. There are small pleural effusions and  also mild left basilar atelectasis. Given the focal nature of the  opacity in the right lung, short-term progress imaging is however  recommended after treatment to assure resolution and exclude other  pathology including mass.       Objective     Baseline Assessment:  Respiratory Status: Oxygen via nasal cannula  Vision:  (eye glasses)  Patient Positioning: Upright in Bed  Baseline Vocal Quality:  (Gravely but clear)    Pain:  Pain Assessment: 0-10  Pain Score: 0 - No pain     Oral/Motor Assessment:  Oral Hygiene:   (Dry oral structures)  Dentition:  (Ill-fitting full dentures.)  Oral Motor: Within Functional Limits    Consistencies Trialed:  Consistencies Trialed: Yes  Consistencies Trialed: Ice Chips, Thin (IDDSI Level 0) - Straw, Thin (IDDSI Level 0) - Cup, Nectar thick/mildly thick (IDDSI Level 2) - Cup, Nectar thick/mildly thick (IDDSI Level 2) - Spoon, Pureed/extremely thick (IDDSI Level 4), Soft & bite sized/chopped (IDDSI Level 6)    Clinical Observations:  Patient Positioning: Upright in Bed  Was The 3 oz Swallow Protocol Completed: No (Pt could only take x2 consecutive sips per breath hold)    Pt was confused but cooperative through out swallow evaluation. Pt managed p.o. trials better without full denture placement due to severity of ill-fitting, but pt insisted on keeping them in oral cavity. Dentures move around when pt speaking. Mastication of mech soft solid was severely slow with primarily anterior munching pattern demonstrated. Some pieces remained unchewed prior to swallowing, increasing choking risk. Pt managed puree trials without incident, and full oral clearance spontaneously achieved. Intermittent mild oral holding demonstrated prior to triggering swallow onset. Hyo-laryngeal elevation appears sluggish to reach maximum height, with a consistent double swallow completed on all tested consistencies, with suspect presence of pharyngeal residuals post initial swallow. Pt denies burning or globus sensation in pharynx post double swallow. No overt s/s of aspiration and vocal quality/respirations remained unchanged from baseline on all tested consistencies. Pt did confirm eliciting a throat clearing post dairy products (chocolate milk and ice cream), but no subtle or overt s/s of aspiration demonstrated this date.     Inpatient:  Education Documentation  Results/recommendations (including modified diet texture, swallow controls, POC) discussed with pt and NSG (Nelli). Swallow guideline poster formulated and  hung behind bed space. Reviewed that pt must be seated fully upright and completed awake/alert if given p.o. over night.     Consultations/Referrals/Coordination of Services: Ongoing Nutrition; appreciate recs for initiation of Remeron for appetite stimulant.

## 2024-02-19 NOTE — PROGRESS NOTES
Moshe Velázquez is a 83 y.o. male on day 3 of admission presenting with Acute metabolic encephalopathy.      Subjective   Patient evaluated on February 19.       Objective     Last Recorded Vitals  /80 (BP Location: Right arm, Patient Position: Lying)   Pulse 103   Temp 36.2 °C (97.2 °F) (Temporal)   Resp 16   Wt 50.5 kg (111 lb 5.3 oz)   SpO2 92%   Intake/Output last 3 Shifts:    Intake/Output Summary (Last 24 hours) at 2/19/2024 1416  Last data filed at 2/19/2024 1015  Gross per 24 hour   Intake 1676.67 ml   Output 2300 ml   Net -623.33 ml       Admission Weight  Weight: 55.8 kg (123 lb) (02/15/24 2200)    Daily Weight  02/19/24 : 50.5 kg (111 lb 5.3 oz)    Image Results  XR chest 1 view  Narrative: Interpreted By:  Rupert Eugene,   STUDY:  XR CHEST 1 VIEW;  2/18/2024 11:34 pm      INDICATION:  Signs/Symptoms:Possible aspiration.      COMPARISON:  2/15/2024      ACCESSION NUMBER(S):  [id number]      ORDERING CLINICIAN:  BIANKA FARRIS      FINDINGS:  Postsurgical change with sternotomy wires. There is stable  cardiomegaly. There is interval bone opacity right mid and lower  lung. There is small right pleural effusion. There is mild left  basilar opacity and small left pleural effusion. No pneumothorax.      Impression: Interval development of opacity in the right mid and lower lung which  may correspond to pneumonia. There are small pleural effusions and  also mild left basilar atelectasis. Given the focal nature of the  opacity in the right lung, short-term progress imaging is however  recommended after treatment to assure resolution and exclude other  pathology including mass.      MACRO:  None      Signed by: Rupert Eugene 2/18/2024 11:52 PM  Dictation workstation:   [id number]      Physical Exam  Constitutional:       Appearance: Normal appearance.   HENT:      Mouth/Throat:      Mouth: Mucous membranes are dry.      Pharynx: Oropharynx is clear.   Cardiovascular:      Rate and Rhythm: Rhythm  irregularly irregular.   Pulmonary:      Comments: Mildly diminished breath sounds  Abdominal:      General: Bowel sounds are normal.      Palpations: Abdomen is soft.   Musculoskeletal:         General: Normal range of motion.      Cervical back: Normal range of motion.   Skin:     General: Skin is warm and dry.      Capillary Refill: Capillary refill takes less than 2 seconds.   Neurological:      General: No focal deficit present.      Mental Status: He is alert. Mental status is at baseline.   Psychiatric:         Mood and Affect: Mood normal.     Relevant Results  Scheduled medications  [Held by provider] azithromycin, 500 mg, intravenous, q24h  cefTRIAXone, 1 g, intravenous, q24h  midodrine, 5 mg, oral, TID  mirtazapine, 15 mg, oral, Nightly  pantoprazole, 40 mg, intravenous, BID  [Held by provider] piperacillin-tazobactam, 3.375 g, intravenous, q6h      Continuous medications  dextrose 5%-0.45 % sodium chloride, 100 mL/hr, Last Rate: 100 mL/hr (02/19/24 1219)      PRN medications  PRN medications: albuterol, oxygen     Results for orders placed or performed during the hospital encounter of 02/15/24 (from the past 24 hour(s))   POCT GLUCOSE   Result Value Ref Range    POCT Glucose 100 (H) 74 - 99 mg/dL   POCT GLUCOSE   Result Value Ref Range    POCT Glucose 135 (H) 74 - 99 mg/dL   POCT GLUCOSE   Result Value Ref Range    POCT Glucose 101 (H) 74 - 99 mg/dL   CBC   Result Value Ref Range    WBC 6.2 4.4 - 11.3 x10*3/uL    nRBC 0.0 0.0 - 0.0 /100 WBCs    RBC 4.85 4.50 - 5.90 x10*6/uL    Hemoglobin 15.9 13.5 - 17.5 g/dL    Hematocrit 47.2 41.0 - 52.0 %    MCV 97 80 - 100 fL    MCH 32.8 26.0 - 34.0 pg    MCHC 33.7 32.0 - 36.0 g/dL    RDW 16.3 (H) 11.5 - 14.5 %    Platelets 178 150 - 450 x10*3/uL   Comprehensive Metabolic Panel   Result Value Ref Range    Glucose 95 74 - 99 mg/dL    Sodium 140 136 - 145 mmol/L    Potassium 4.2 3.5 - 5.3 mmol/L    Chloride 101 98 - 107 mmol/L    Bicarbonate 35 (H) 21 - 32 mmol/L     Anion Gap 8 (L) 10 - 20 mmol/L    Urea Nitrogen 23 6 - 23 mg/dL    Creatinine 0.76 0.50 - 1.30 mg/dL    eGFR 89 >60 mL/min/1.73m*2    Calcium 9.2 8.6 - 10.3 mg/dL    Albumin 3.0 (L) 3.4 - 5.0 g/dL    Alkaline Phosphatase 29 (L) 33 - 136 U/L    Total Protein 4.9 (L) 6.4 - 8.2 g/dL    AST 29 9 - 39 U/L    Bilirubin, Total 1.3 (H) 0.0 - 1.2 mg/dL    ALT 26 10 - 52 U/L   POCT GLUCOSE   Result Value Ref Range    POCT Glucose 130 (H) 74 - 99 mg/dL      XR chest 1 view    Result Date: 2/18/2024  Interpreted By:  Rupert Eugene, STUDY: XR CHEST 1 VIEW;  2/18/2024 11:34 pm   INDICATION: Signs/Symptoms:Possible aspiration.   COMPARISON: 2/15/2024   ACCESSION NUMBER(S): [id number]   ORDERING CLINICIAN: BIANKA FARRIS   FINDINGS: Postsurgical change with sternotomy wires. There is stable cardiomegaly. There is interval bone opacity right mid and lower lung. There is small right pleural effusion. There is mild left basilar opacity and small left pleural effusion. No pneumothorax.       Interval development of opacity in the right mid and lower lung which may correspond to pneumonia. There are small pleural effusions and also mild left basilar atelectasis. Given the focal nature of the opacity in the right lung, short-term progress imaging is however recommended after treatment to assure resolution and exclude other pathology including mass.   MACRO: None   Signed by: Rupert Eugene 2/18/2024 11:52 PM Dictation workstation:   [id number]    ECG 12 lead    Result Date: 2/16/2024  Atrial fibrillation Anteroseptal infarct, old Repol abnrm suggests ischemia, diffuse leads See ED provider note for full interpretation and clinical correlation Confirmed by Charisma Díaz ([id number]) on 2/16/2024 5:30:36 PM    Transthoracic Echo (TTE) Complete    Result Date: 2/16/2024    [facility], [address] [facility] [address]Zhq [phone] and                                 Fax [phone] TRANSTHORACIC ECHOCARDIOGRAM REPORT   Patient Name:      VICTOR M MATHIAS       Filemon Physician:    [id number] Kael Real MD Study Date:        2/16/2024            Ordering Provider:    [id number] REJI WASHINGTON MRN/PID:           [id number]             Fellow: Accession#:        [id number]         Nurse:                Grace Matamoros RN Date of Birth/Age: 1940 / 83 years Sonographer:          Lorna Olivares RDCS Gender:            M                    Additional Staff: Height:            182.88 cm            Admit Date:           2/16/2024 Weight:            55.79 kg             Admission Status:     Inpatient -                                                               Routine BSA / BMI:         1.73 m2 / 16.68      Encounter#:           [id number]                    kg/m2                                         Department Location:  [facility] Blood Pressure: 112 /71 mmHg Study Type:    TRANSTHORACIC ECHO (TTE) COMPLETE Diagnosis/ICD: Other fluid overload-E87.79 Indication:    A-Fib AMS CPT Code:      Echo Complete w Full Doppler-17922 Patient History: Pertinent History: CAD, PVD, A-Fib, HTN and Hyperlipidemia. Hx CABG, DM. Study Detail: The following Echo studies were performed: 2D, M-Mode, Doppler and               color flow. Technically challenging study due to patient lying in               supine position and body habitus. Definity used as a contrast               agent for endocardial border definition. Total contrast used for               this procedure was 2 mL via IV push.  PHYSICIAN INTERPRETATION: Left Ventricle: The left ventricular systolic function is moderately decreased, with an estimated ejection fraction of 30-35%. The patient is in atrial fibrillation which may  influence the estimate of left ventricular function and transvalvular flows. There is global hypokinesis of the left ventricle with minor regional variations. The left ventricular cavity size is normal. Left ventricular diastolic filling was indeterminate. Distal LV septum and LV apex are hypokinetic. Left Atrium: The left atrium is moderately dilated. Right Ventricle: The right ventricle is normal in size. There is reduced right ventricular systolic function. Right Atrium: The right atrium is mildly dilated. Aortic Valve: The aortic valve is trileaflet. There is mild aortic valve cusp calcification. There is mild aortic valve thickening. There is trace to mild aortic valve regurgitation. The peak instantaneous gradient of the aortic valve is 5.3 mmHg. Mitral Valve: The mitral valve is mildly thickened. The mitral valve spectral Doppler A-wave is absent, consistent with atrial fibrillation. There is mild mitral annular calcification. There is mild mitral valve regurgitation. Tricuspid Valve: The tricuspid valve is structurally normal. There is mild tricuspid regurgitation. The Doppler estimated RVSP is slightly elevated at 31.4 mmHg. Pulmonic Valve: The pulmonic valve is structurally normal. There is physiologic pulmonic valve regurgitation. Pericardium: There is no pericardial effusion noted. Aorta: The aortic root is normal. There is upper limits of normal dilatation of the ascending aorta. Systemic Veins: The inferior vena cava appears to be of normal size. In comparison to the previous echocardiogram(s): There are no prior studies on this patient for comparison purposes.  CONCLUSIONS:  1. Left ventricular systolic function is moderately decreased with a 30-35% estimated ejection fraction.  2. There is global hypokinesis of the left ventricle with minor regional variations.  3. Distal LV septum and LV apex are hypokinetic.  4. The patient is in atrial fibrillation which may influence the estimate of left  ventricular function and transvalvular flows.  5. The left atrium is moderately dilated.  6. Absent A-wave on MV spectral Doppler tracing, consistent with atrial fibrillation.  7. There is reduced right ventricular systolic function.  8. Slightly elevated RVSP. QUANTITATIVE DATA SUMMARY: 2D MEASUREMENTS:                           Normal Ranges: Ao Root d:     3.60 cm    (2.0-3.7cm) LAs:           5.10 cm    (2.7-4.0cm) IVSd:          1.19 cm    (0.6-1.1cm) LVPWd:         1.11 cm    (0.6-1.1cm) LVIDd:         4.80 cm    (3.9-5.9cm) LVIDs:         4.07 cm LV Mass Index: 119.1 g/m2 LV % FS        15.2 % LA VOLUME:                               Normal Ranges: LA Vol A4C:        69.2 ml    (22+/-6mL/m2) LA Vol A2C:        91.1 ml LA Vol BP:         81.8 ml LA Vol Index A4C:  39.9ml/m2 LA Vol Index A2C:  52.6 ml/m2 LA Vol Index BP:   47.2 ml/m2 LA Area A4C:       23.7 cm2 LA Area A2C:       26.4 cm2 LA Major Axis A4C: 6.9 cm LA Major Axis A2C: 6.5 cm LA Volume Index:   43.4 ml/m2 LA Vol A4C:        64.0 ml LA Vol A2C:        84.8 ml RA VOLUME BY A/L METHOD:                       Normal Ranges: RA Area A4C: 19.0 cm2 M-MODE MEASUREMENTS:                  Normal Ranges: Ao Root: 3.80 cm (2.0-3.7cm) LAs:     4.50 cm (2.7-4.0cm) AORTA MEASUREMENTS:                    Normal Ranges: Asc Ao, d: 3.80 cm (2.1-3.4cm) LV SYSTOLIC FUNCTION BY 2D PLANIMETRY (MOD):                     Normal Ranges: EF-A4C View: 32.6 % (>=55%) EF-A2C View: 48.8 % EF-Biplane:  37.9 % AORTIC VALVE:                         Normal Ranges: AoV Vmax:      1.15 m/s (<=1.7m/s) AoV Peak P.3 mmHg (<20mmHg) LVOT Max Rob:  0.49 m/s (<=1.1m/s) LVOT VTI:      6.87 cm LVOT Diameter: 2.20 cm  (1.8-2.4cm) AoV Area,Vmax: 1.63 cm2 (2.5-4.5cm2) AORTIC INSUFFICIENCY: AI Vmax:       2.39 m/s AI Half-time:  420 msec AI Decel Rate: 167.00 cm/s2  RIGHT VENTRICLE: RV Basal 3.51 cm RV Mid   1.90 cm RV Major 7.6 cm TAPSE:   12.2 mm RV s'    0.08 m/s TRICUSPID VALVE/RVSP:                              Normal Ranges: Peak TR Velocity: 2.57 m/s RV Syst Pressure: 31.4 mmHg (< 30mmHg) IVC Diam:         1.64 cm  [id number] Kael Real MD Electronically signed on 2/16/2024 at 4:48:12 PM  ** Final **     CT head wo IV contrast    Result Date: 2/15/2024  Interpreted By:  Weston Suarez, STUDY: CT HEAD WO IV CONTRAST;  2/15/2024 11:15 pm   INDICATION: Signs/Symptoms:ams.   COMPARISON: None.   ACCESSION NUMBER(S): [id number]   ORDERING CLINICIAN: JENNY MULLER   TECHNIQUE: Axial noncontrast CT images of the head.   FINDINGS: Gray-white matter differentiation is maintained. There are no extra-axial collections. No mass effect or midline shift. There is no hydrocephalus. There is prominence of the bifrontal extra-axial spaces and sylvian fissures which may represent focal cerebral volume loss.   HEMORRHAGE: No acute intracranial hemorrhage.   CALVARIUM: No depressed skull fracture.   EXTRACRANIAL SOFT TISSUES:... Unremarkable.   PARANASAL SINUSES/MASTOIDS: The visualized paranasal sinuses are well aerated. Mastoid air cells are clear.   ORBITS: Grossly normal.       No acute intracranial hemorrhage or acute cortical infarct.     Signed by: Weston Suarez 2/15/2024 11:58 PM Dictation workstation:   [id number]    XR chest 1 view    Result Date: 2/15/2024  Interpreted By:  Weston Suarez, STUDY: XR CHEST 1 VIEW;  2/15/2024 10:18 pm   INDICATION: Signs/Symptoms:ams.   COMPARISON: None.   ACCESSION NUMBER(S): [id number]   ORDERING CLINICIAN: JENNY MULLER   FINDINGS:   CARDIOMEDIASTINAL SILHOUETTE: Cardiomediastinal silhouette is normal in size and configuration taking into account portable technique and patient rotation. There are post CABG changes.   LUNGS/PLEURA: There are no consolidations. There is mild blunting of the costophrenic angles suggestive of small bilateral pleural effusions.. There is no demonstrated pneumothorax.     BONES: No evidence of acute osseous abnormality.       1. Suggestion of  small bilateral pleural effusions. No evidence of consolidation.     Signed by: Weston Suarez 2/15/2024 10:54 PM Dictation workstation:   [id number]        Assessment/Plan        Moshe is an 83-year-old male patient who presents to emergency department from skilled nursing facility for altered mental status.  Patient is currently alert and oriented, has a history of dementia.  Patient denies any complaints.  Patient was reportedly hypoxic at the nursing facility.  Patient has a history of A-fib and is on anticoagulation.  EKG in ED showing A-fib with a controlled rate.  Chest x-ray completed showing pleural effusions and interstitial edema without evidence of pneumonia or consolidation.  Antibiotics given in ED but held for admission.  Echocardiogram ordered for the morning as last echocardiogram on file was from 7/27/2021.  BNP elevated in the 700s.  IV fluids held due to elevated BNP, awaiting home medications.  Patient admitted for further medical management.     Acute metabolic encephalopathy/elevated troponin/HONG  See imaging results above  No evidence of pneumonia or consolidation    Hold IV fluids due to fluid overload  Trend troponin  Telemetry monitoring  Repeat labs in a.m.   Daily weight  Intake and output  urinalysis pending     A-fib/hypertension/CAD/BPH/lymphoma/diabetes/ulcerative colitis/GERD  Cardiac/diabetic diet  ISS  Hypoglycemia protocol  Telemetry monitoring   Continue home Xarelto when med rec is complete  Hold ACEs and ARB's 2/2 HONG     DVT Ppx  SCDs    continue home Xarelto when med rec is complete  PT/OT        I spent 35 minutes in the professional and overall care of this patient.     Patient fully evaluated and plan as above     Patient fully evaluated on February 16.  Continue to recheck urine and ammonia levels.  IV Rocephin given recheck labs in AM.      Patient fully evaluated on February 17.  Slightly more awake and alert.  Continue present antibiotic regimen.  Await final  cultures.  Recheck labs in AM.    Patient fully evaluated on February 18.  Continue present antibiotic regimen.  Slightly more animated.  Oral intake is marginal and recheck labs in AM.  Remeron added for appetite stimulant.    Patient fully evaluated on February 19. Chest XR shows opacity. Awaiting results of chest CT. Potential for PEG for nocturnal enteral support, will discuss with family. Recheck labs in AM.                     DEUCE CUELLAR

## 2024-02-20 LAB
ALBUMIN SERPL BCP-MCNC: 2.9 G/DL (ref 3.4–5)
ALP SERPL-CCNC: 30 U/L (ref 33–136)
ALT SERPL W P-5'-P-CCNC: 27 U/L (ref 10–52)
ANION GAP SERPL CALC-SCNC: 9 MMOL/L (ref 10–20)
AST SERPL W P-5'-P-CCNC: 27 U/L (ref 9–39)
BACTERIA BLD CULT: NORMAL
BACTERIA BLD CULT: NORMAL
BILIRUB SERPL-MCNC: 1.4 MG/DL (ref 0–1.2)
BUN SERPL-MCNC: 19 MG/DL (ref 6–23)
CALCIUM SERPL-MCNC: 9 MG/DL (ref 8.6–10.3)
CHLORIDE SERPL-SCNC: 101 MMOL/L (ref 98–107)
CO2 SERPL-SCNC: 34 MMOL/L (ref 21–32)
CREAT SERPL-MCNC: 0.67 MG/DL (ref 0.5–1.3)
EGFRCR SERPLBLD CKD-EPI 2021: >90 ML/MIN/1.73M*2
ERYTHROCYTE [DISTWIDTH] IN BLOOD BY AUTOMATED COUNT: 16.3 % (ref 11.5–14.5)
GLUCOSE BLD MANUAL STRIP-MCNC: 104 MG/DL (ref 74–99)
GLUCOSE BLD MANUAL STRIP-MCNC: 108 MG/DL (ref 74–99)
GLUCOSE BLD MANUAL STRIP-MCNC: 95 MG/DL (ref 74–99)
GLUCOSE BLD MANUAL STRIP-MCNC: 96 MG/DL (ref 74–99)
GLUCOSE SERPL-MCNC: 96 MG/DL (ref 74–99)
HCT VFR BLD AUTO: 42.4 % (ref 41–52)
HGB BLD-MCNC: 14.8 G/DL (ref 13.5–17.5)
MCH RBC QN AUTO: 33 PG (ref 26–34)
MCHC RBC AUTO-ENTMCNC: 34.9 G/DL (ref 32–36)
MCV RBC AUTO: 94 FL (ref 80–100)
NRBC BLD-RTO: 0 /100 WBCS (ref 0–0)
PLATELET # BLD AUTO: 190 X10*3/UL (ref 150–450)
POTASSIUM SERPL-SCNC: 3.8 MMOL/L (ref 3.5–5.3)
PROT SERPL-MCNC: 4.7 G/DL (ref 6.4–8.2)
RBC # BLD AUTO: 4.49 X10*6/UL (ref 4.5–5.9)
SODIUM SERPL-SCNC: 140 MMOL/L (ref 136–145)
WBC # BLD AUTO: 6.7 X10*3/UL (ref 4.4–11.3)

## 2024-02-20 PROCEDURE — 82947 ASSAY GLUCOSE BLOOD QUANT: CPT

## 2024-02-20 PROCEDURE — C9113 INJ PANTOPRAZOLE SODIUM, VIA: HCPCS | Performed by: NURSE PRACTITIONER

## 2024-02-20 PROCEDURE — 85027 COMPLETE CBC AUTOMATED: CPT | Performed by: INTERNAL MEDICINE

## 2024-02-20 PROCEDURE — 36415 COLL VENOUS BLD VENIPUNCTURE: CPT | Performed by: INTERNAL MEDICINE

## 2024-02-20 PROCEDURE — 2500000001 HC RX 250 WO HCPCS SELF ADMINISTERED DRUGS (ALT 637 FOR MEDICARE OP): Performed by: INTERNAL MEDICINE

## 2024-02-20 PROCEDURE — 1100000001 HC PRIVATE ROOM DAILY

## 2024-02-20 PROCEDURE — 2500000004 HC RX 250 GENERAL PHARMACY W/ HCPCS (ALT 636 FOR OP/ED): Performed by: INTERNAL MEDICINE

## 2024-02-20 PROCEDURE — 2500000005 HC RX 250 GENERAL PHARMACY W/O HCPCS: Performed by: INTERNAL MEDICINE

## 2024-02-20 PROCEDURE — 2500000001 HC RX 250 WO HCPCS SELF ADMINISTERED DRUGS (ALT 637 FOR MEDICARE OP)

## 2024-02-20 PROCEDURE — 2500000004 HC RX 250 GENERAL PHARMACY W/ HCPCS (ALT 636 FOR OP/ED)

## 2024-02-20 PROCEDURE — 80053 COMPREHEN METABOLIC PANEL: CPT | Performed by: INTERNAL MEDICINE

## 2024-02-20 PROCEDURE — 2500000004 HC RX 250 GENERAL PHARMACY W/ HCPCS (ALT 636 FOR OP/ED): Performed by: NURSE PRACTITIONER

## 2024-02-20 RX ORDER — OLANZAPINE 10 MG/2ML
5 INJECTION, POWDER, FOR SOLUTION INTRAMUSCULAR ONCE AS NEEDED
Status: COMPLETED | OUTPATIENT
Start: 2024-02-20 | End: 2024-02-20

## 2024-02-20 RX ORDER — LORAZEPAM 2 MG/ML
1 INJECTION INTRAMUSCULAR ONCE
Status: COMPLETED | OUTPATIENT
Start: 2024-02-20 | End: 2024-02-20

## 2024-02-20 RX ORDER — OLANZAPINE 10 MG/2ML
2 INJECTION, POWDER, FOR SOLUTION INTRAMUSCULAR ONCE AS NEEDED
Status: DISCONTINUED | OUTPATIENT
Start: 2024-02-20 | End: 2024-02-22 | Stop reason: HOSPADM

## 2024-02-20 RX ORDER — BISACODYL 5 MG
5 TABLET, DELAYED RELEASE (ENTERIC COATED) ORAL DAILY PRN
Status: DISCONTINUED | OUTPATIENT
Start: 2024-02-20 | End: 2024-02-22 | Stop reason: HOSPADM

## 2024-02-20 RX ORDER — POLYETHYLENE GLYCOL 3350 17 G/17G
17 POWDER, FOR SOLUTION ORAL DAILY PRN
Status: DISCONTINUED | OUTPATIENT
Start: 2024-02-20 | End: 2024-02-22 | Stop reason: HOSPADM

## 2024-02-20 RX ORDER — METOPROLOL TARTRATE 1 MG/ML
5 INJECTION, SOLUTION INTRAVENOUS ONCE
Status: COMPLETED | OUTPATIENT
Start: 2024-02-20 | End: 2024-02-20

## 2024-02-20 RX ORDER — KETOROLAC TROMETHAMINE 30 MG/ML
15 INJECTION, SOLUTION INTRAMUSCULAR; INTRAVENOUS ONCE
Status: COMPLETED | OUTPATIENT
Start: 2024-02-20 | End: 2024-02-20

## 2024-02-20 RX ORDER — DOCUSATE SODIUM 100 MG/1
100 CAPSULE, LIQUID FILLED ORAL 2 TIMES DAILY
Status: DISCONTINUED | OUTPATIENT
Start: 2024-02-20 | End: 2024-02-22 | Stop reason: HOSPADM

## 2024-02-20 RX ADMIN — LORAZEPAM 1 MG: 2 INJECTION, SOLUTION INTRAMUSCULAR; INTRAVENOUS at 00:29

## 2024-02-20 RX ADMIN — PANTOPRAZOLE SODIUM 40 MG: 40 INJECTION, POWDER, FOR SOLUTION INTRAVENOUS at 21:06

## 2024-02-20 RX ADMIN — METOPROLOL TARTRATE 5 MG: 1 INJECTION, SOLUTION INTRAVENOUS at 15:26

## 2024-02-20 RX ADMIN — DOCUSATE SODIUM 100 MG: 100 CAPSULE, LIQUID FILLED ORAL at 21:08

## 2024-02-20 RX ADMIN — CEFUROXIME AXETIL 250 MG: 250 TABLET, FILM COATED ORAL at 10:00

## 2024-02-20 RX ADMIN — DEXTROSE AND SODIUM CHLORIDE 100 ML/HR: 5; 450 INJECTION, SOLUTION INTRAVENOUS at 17:43

## 2024-02-20 RX ADMIN — CEFUROXIME AXETIL 250 MG: 250 TABLET, FILM COATED ORAL at 21:06

## 2024-02-20 RX ADMIN — MIRTAZAPINE 15 MG: 15 TABLET, FILM COATED ORAL at 21:06

## 2024-02-20 RX ADMIN — PANTOPRAZOLE SODIUM 40 MG: 40 INJECTION, POWDER, FOR SOLUTION INTRAVENOUS at 08:09

## 2024-02-20 RX ADMIN — OLANZAPINE 5 MG: 10 INJECTION, POWDER, FOR SOLUTION INTRAMUSCULAR at 08:04

## 2024-02-20 RX ADMIN — MIDODRINE HYDROCHLORIDE 5 MG: 2.5 TABLET ORAL at 21:06

## 2024-02-20 RX ADMIN — POLYETHYLENE GLYCOL 3350 17 G: 17 POWDER, FOR SOLUTION ORAL at 21:07

## 2024-02-20 RX ADMIN — KETOROLAC TROMETHAMINE 15 MG: 30 INJECTION, SOLUTION INTRAMUSCULAR; INTRAVENOUS at 21:02

## 2024-02-20 ASSESSMENT — PAIN SCALES - GENERAL: PAINLEVEL_OUTOF10: 10 - WORST POSSIBLE PAIN

## 2024-02-20 ASSESSMENT — PAIN - FUNCTIONAL ASSESSMENT: PAIN_FUNCTIONAL_ASSESSMENT: 0-10

## 2024-02-20 NOTE — PROGRESS NOTES
Speech-Language Pathology                 Therapy Communication Note    Patient Name: Moshe Velázquez  MRN: [id number]  Today's Date: 2/20/2024     Discipline: Speech Language Pathology    Missed Visit Reason:  Pt sedated - unable to arouse pt even post max verbal and tactile stimulation.     Missed Time: Attempt    Comment:   Per nurse Guzman pt s/p the med Zyprexa earlier today to aid calming him  as he was pulling at his Vega.   Lunch tray left untouched  in the room.

## 2024-02-20 NOTE — PROGRESS NOTES
Occupational Therapy                 Therapy Communication Note    Patient Name: Moshe Velázquez  MRN: [id number]  Today's Date: 2/20/2024     Discipline: Occupational Therapy    Missed Visit Reason: Missed Visit Reason:  (per discussion with RN, pt. sleeping, has been medicated with Haldol)    Missed Time: Attempt    Comment:

## 2024-02-20 NOTE — CARE PLAN
Problem: Nutrition  Goal: Less than 5 days NPO/clear liquids  Outcome: Progressing  Goal: Oral intake greater than 50%  Outcome: Progressing  Goal: Oral intake greater 75%  Outcome: Progressing  Goal: Consume prescribed supplement  Outcome: Progressing  Goal: Adequate PO fluid intake  Outcome: Progressing  Goal: Nutrition support goals are met within 48 hrs  Outcome: Progressing  Goal: Nutrition support is meeting 75% of nutrient needs  Outcome: Progressing  Goal: Tube feed tolerance  Outcome: Progressing  Goal: BG  mg/dL  Outcome: Progressing  Goal: Lab values WNL  Outcome: Progressing  Goal: Electrolytes WNL  Outcome: Progressing  Goal: Promote healing  Outcome: Progressing  Goal: Maintain stable weight  Outcome: Progressing  Goal: Reduce weight from edema/fluid  Outcome: Progressing  Goal: Gradual weight gain  Outcome: Progressing  Goal: Improve ostomy output  Outcome: Progressing     Problem: Pain - Adult  Goal: Verbalizes/displays adequate comfort level or baseline comfort level  Outcome: Progressing     Problem: Safety - Adult  Goal: Free from fall injury  Outcome: Progressing     Problem: Discharge Planning  Goal: Discharge to home or other facility with appropriate resources  Outcome: Progressing     Problem: Chronic Conditions and Co-morbidities  Goal: Patient's chronic conditions and co-morbidity symptoms are monitored and maintained or improved  Outcome: Progressing     Problem: Skin  Goal: Decreased wound size/increased tissue granulation at next dressing change  Outcome: Progressing  Goal: Participates in plan/prevention/treatment measures  Outcome: Progressing  Goal: Prevent/manage excess moisture  Outcome: Progressing  Goal: Prevent/minimize sheer/friction injuries  Outcome: Progressing  Goal: Promote/optimize nutrition  Outcome: Progressing  Goal: Promote skin healing  Outcome: Progressing     Problem: Diabetes  Goal: Achieve decreasing blood glucose levels by end of shift  Outcome:  Progressing  Goal: Increase stability of blood glucose readings by end of shift  Outcome: Progressing  Goal: Decrease in ketones present in urine by end of shift  Outcome: Progressing  Goal: Maintain electrolyte levels within acceptable range throughout shift  Outcome: Progressing  Goal: Maintain glucose levels >70mg/dl to <250mg/dl throughout shift  Outcome: Progressing  Goal: No changes in neurological exam by end of shift  Outcome: Progressing  Goal: Learn about and adhere to nutrition recommendations by end of shift  Outcome: Progressing  Goal: Vital signs within normal range for age by end of shift  Outcome: Progressing  Goal: Increase self care and/or family involovement by end of shift  Outcome: Progressing  Goal: Receive DSME education by end of shift  Outcome: Progressing     Problem: Fall/Injury  Goal: Not fall by end of shift  Outcome: Progressing  Goal: Be free from injury by end of the shift  Outcome: Progressing  Goal: Verbalize understanding of personal risk factors for fall in the hospital  Outcome: Progressing  Goal: Verbalize understanding of risk factor reduction measures to prevent injury from fall in the home  Outcome: Progressing  Goal: Use assistive devices by end of the shift  Outcome: Progressing  Goal: Pace activities to prevent fatigue by end of the shift  Outcome: Progressing   The patient's goals for the shift include      The clinical goals for the shift include eat >50% of meals

## 2024-02-20 NOTE — PROGRESS NOTES
Moshe Velázquez is a 83 y.o. male on day 4 of admission presenting with Acute metabolic encephalopathy.      Subjective   Patient evaluated on February 20.       Objective     Last Recorded Vitals  /89   Pulse (!) 140   Temp 36 °C (96.8 °F)   Resp 18   Wt 50.5 kg (111 lb 5.3 oz)   SpO2 94%   Intake/Output last 3 Shifts:    Intake/Output Summary (Last 24 hours) at 2/20/2024 1333  Last data filed at 2/19/2024 1900  Gross per 24 hour   Intake --   Output 525 ml   Net -525 ml       Admission Weight  Weight: 55.8 kg (123 lb) (02/15/24 2200)    Daily Weight  02/20/24 : 50.5 kg (111 lb 5.3 oz)    Image Results  CT chest w IV contrast  Narrative: Interpreted By:  Bahman Laughlin,   STUDY:  CT CHEST W IV CONTRAST;  2/19/2024 8:00 pm      INDICATION:  Signs/Symptoms:lung mass.      COMPARISON:  None.      ACCESSION NUMBER(S):  [id number]      ORDERING CLINICIAN:  SALVADOR HIRSCH      TECHNIQUE:  Helical data acquisition of the chest was obtained  Scans were  obtained after the intravenous administration of 75 milliliters of  Omnipaque 350 contrast material..  Images were reformatted in axial,  coronal, and sagittal planes.      FINDINGS:  LUNGS and AIRWAYS:  There are relatively small bilateral pleural effusions, but which do  extend within the major fissures and would account for lung opacities  on the previous day's exam. There is also basilar compressive  atelectasis. Mild emphysema, with subpleural reticular prominence at  the lower lungs predominantly posteriorly with subpleural cysts  indicating fibrosis. There are calcified plaques posteriorly and  diaphragmatic probably from asbestosis exposure.      MEDIASTINUM and CY, LOWER NECK AND AXILLA:  The visualized thyroid gland is within normal limits.      No evidence of thoracic lymphadenopathy by CT criteria.      HEART and VESSELS:  The thoracic aorta is of normal course and caliber without  significant atherosclerotic calcification .      Main pulmonary artery  and its branches are normal in caliber.      Coronary artery calcification is present, patient is however status  post median sternotomy.      The cardiac chambers are not enlarged.      UPPER ABDOMEN:  There is a densely peripherally calcified exophytic right upper renal  pole cyst. CT renal protocol would be recommended if further  evaluation for Bosniak classification and malignant risk is needed.      CHEST WALL, OSSEOUS STRUCTURES AND OTHER FINDINGS:  There are no suspicious osseous lesions.      Impression: 1.  Bilateral effusions as described.  2. Emphysema with fibrosis. Calcified pleural plaques.  3. Right renal cysts with capsular calcification.      Signed by: Bahman Laughlin 2/20/2024 10:03 AM  Dictation workstation:   [id number]      Physical Exam  Constitutional:       Appearance: Normal appearance.   HENT:      Mouth/Throat:      Mouth: Mucous membranes are dry.      Pharynx: Oropharynx is clear.   Cardiovascular:      Rate and Rhythm: Rhythm irregularly irregular.   Pulmonary:      Comments: Mildly diminished breath sounds  Abdominal:      General: Bowel sounds are normal.      Palpations: Abdomen is soft.   Musculoskeletal:         General: Normal range of motion.      Cervical back: Normal range of motion.   Skin:     General: Skin is warm and dry.      Capillary Refill: Capillary refill takes less than 2 seconds.   Neurological:      General: No focal deficit present.      Mental Status: He is alert. Mental status is at baseline.   Psychiatric:         Mood and Affect: Mood normal.     Relevant Results  Scheduled medications  [Held by provider] azithromycin, 500 mg, intravenous, q24h  cefuroxime, 250 mg, oral, BID  midodrine, 5 mg, oral, TID  mirtazapine, 15 mg, oral, Nightly  pantoprazole, 40 mg, intravenous, BID  [Held by provider] piperacillin-tazobactam, 3.375 g, intravenous, q6h      Continuous medications  dextrose 5%-0.45 % sodium chloride, 100 mL/hr, Last Rate: 100 mL/hr (02/19/24  1219)      PRN medications  PRN medications: albuterol, oxygen     Results for orders placed or performed during the hospital encounter of 02/15/24 (from the past 24 hour(s))   POCT GLUCOSE   Result Value Ref Range    POCT Glucose 131 (H) 74 - 99 mg/dL   CBC   Result Value Ref Range    WBC 6.7 4.4 - 11.3 x10*3/uL    nRBC 0.0 0.0 - 0.0 /100 WBCs    RBC 4.49 (L) 4.50 - 5.90 x10*6/uL    Hemoglobin 14.8 13.5 - 17.5 g/dL    Hematocrit 42.4 41.0 - 52.0 %    MCV 94 80 - 100 fL    MCH 33.0 26.0 - 34.0 pg    MCHC 34.9 32.0 - 36.0 g/dL    RDW 16.3 (H) 11.5 - 14.5 %    Platelets 190 150 - 450 x10*3/uL   Comprehensive Metabolic Panel   Result Value Ref Range    Glucose 96 74 - 99 mg/dL    Sodium 140 136 - 145 mmol/L    Potassium 3.8 3.5 - 5.3 mmol/L    Chloride 101 98 - 107 mmol/L    Bicarbonate 34 (H) 21 - 32 mmol/L    Anion Gap 9 (L) 10 - 20 mmol/L    Urea Nitrogen 19 6 - 23 mg/dL    Creatinine 0.67 0.50 - 1.30 mg/dL    eGFR >90 >60 mL/min/1.73m*2    Calcium 9.0 8.6 - 10.3 mg/dL    Albumin 2.9 (L) 3.4 - 5.0 g/dL    Alkaline Phosphatase 30 (L) 33 - 136 U/L    Total Protein 4.7 (L) 6.4 - 8.2 g/dL    AST 27 9 - 39 U/L    Bilirubin, Total 1.4 (H) 0.0 - 1.2 mg/dL    ALT 27 10 - 52 U/L   POCT GLUCOSE   Result Value Ref Range    POCT Glucose 108 (H) 74 - 99 mg/dL   POCT GLUCOSE   Result Value Ref Range    POCT Glucose 104 (H) 74 - 99 mg/dL        CT chest w IV contrast    Result Date: 2/20/2024  Interpreted By:  Bahman Laughlin, STUDY: CT CHEST W IV CONTRAST;  2/19/2024 8:00 pm   INDICATION: Signs/Symptoms:lung mass.   COMPARISON: None.   ACCESSION NUMBER(S): [id number]   ORDERING CLINICIAN: SALVADOR HIRSCH   TECHNIQUE: Helical data acquisition of the chest was obtained  Scans were obtained after the intravenous administration of 75 milliliters of Omnipaque 350 contrast material..  Images were reformatted in axial, coronal, and sagittal planes.   FINDINGS: LUNGS and AIRWAYS: There are relatively small bilateral pleural effusions, but  which do extend within the major fissures and would account for lung opacities on the previous day's exam. There is also basilar compressive atelectasis. Mild emphysema, with subpleural reticular prominence at the lower lungs predominantly posteriorly with subpleural cysts indicating fibrosis. There are calcified plaques posteriorly and diaphragmatic probably from asbestosis exposure.   MEDIASTINUM and CY, LOWER NECK AND AXILLA: The visualized thyroid gland is within normal limits.   No evidence of thoracic lymphadenopathy by CT criteria.   HEART and VESSELS: The thoracic aorta is of normal course and caliber without significant atherosclerotic calcification .   Main pulmonary artery and its branches are normal in caliber.   Coronary artery calcification is present, patient is however status post median sternotomy.   The cardiac chambers are not enlarged.   UPPER ABDOMEN: There is a densely peripherally calcified exophytic right upper renal pole cyst. CT renal protocol would be recommended if further evaluation for Bosniak classification and malignant risk is needed.   CHEST WALL, OSSEOUS STRUCTURES AND OTHER FINDINGS: There are no suspicious osseous lesions.       1.  Bilateral effusions as described. 2. Emphysema with fibrosis. Calcified pleural plaques. 3. Right renal cysts with capsular calcification.   Signed by: Bahman Laughlin 2/20/2024 10:03 AM Dictation workstation:   [id number]    XR chest 1 view    Result Date: 2/18/2024  Interpreted By:  Rupert Eugene, STUDY: XR CHEST 1 VIEW;  2/18/2024 11:34 pm   INDICATION: Signs/Symptoms:Possible aspiration.   COMPARISON: 2/15/2024   ACCESSION NUMBER(S): [id number]   ORDERING CLINICIAN: BIANKA FARRIS   FINDINGS: Postsurgical change with sternotomy wires. There is stable cardiomegaly. There is interval bone opacity right mid and lower lung. There is small right pleural effusion. There is mild left basilar opacity and small left pleural effusion. No pneumothorax.        Interval development of opacity in the right mid and lower lung which may correspond to pneumonia. There are small pleural effusions and also mild left basilar atelectasis. Given the focal nature of the opacity in the right lung, short-term progress imaging is however recommended after treatment to assure resolution and exclude other pathology including mass.   MACRO: None   Signed by: Rupert Eguene 2/18/2024 11:52 PM Dictation workstation:   [id number]    ECG 12 lead    Result Date: 2/16/2024  Atrial fibrillation Anteroseptal infarct, old Repol abnrm suggests ischemia, diffuse leads See ED provider note for full interpretation and clinical correlation Confirmed by Charisma Díaz ([id number]) on 2/16/2024 5:30:36 PM    Transthoracic Echo (TTE) Complete    Result Date: 2/16/2024    [facility], [address] [place] [id number] [phone] and                                 Fax [phone] TRANSTHORACIC ECHOCARDIOGRAM REPORT  Patient Name:      VICTOR M Colbert Physician:    [id number] Kael Real MD Study Date:        2/16/2024            Ordering Provider:    [id number] REJI WASHINGTON MRN/PID:           [id number]             Fellow: Accession#:        [id number]         Nurse:                Grace Matamoros RN Date of Birth/Age: 1940 / 83 years Sonographer:          Lorna Olivares RDCS Gender:            M                    Additional Staff: Height:            182.88 cm            Admit Date:           2/16/2024 Weight:            55.79 kg             Admission Status:     Inpatient -                                                               Routine BSA / BMI:         1.73 m2 / 16.68      Encounter#:           [id number]                     kg/m2                                         Department Location:  [facility] Blood Pressure: 112 /71 mmHg Study Type:    TRANSTHORACIC ECHO (TTE) COMPLETE Diagnosis/ICD: Other fluid overload-E87.79 Indication:    A-Fib AMS CPT Code:      Echo Complete w Full [id number] Patient History: Pertinent History: CAD, PVD, A-Fib, HTN and Hyperlipidemia. Hx CABG, DM. Study Detail: The following Echo studies were performed: 2D, M-Mode, Doppler and               color flow. Technically challenging study due to patient lying in               supine position and body habitus. Definity used as a contrast               agent for endocardial border definition. Total contrast used for               this procedure was 2 mL via IV push.  PHYSICIAN INTERPRETATION: Left Ventricle: The left ventricular systolic function is moderately decreased, with an estimated ejection fraction of 30-35%. The patient is in atrial fibrillation which may influence the estimate of left ventricular function and transvalvular flows. There is global hypokinesis of the left ventricle with minor regional variations. The left ventricular cavity size is normal. Left ventricular diastolic filling was indeterminate. Distal LV septum and LV apex are hypokinetic. Left Atrium: The left atrium is moderately dilated. Right Ventricle: The right ventricle is normal in size. There is reduced right ventricular systolic function. Right Atrium: The right atrium is mildly dilated. Aortic Valve: The aortic valve is trileaflet. There is mild aortic valve cusp calcification. There is mild aortic valve thickening. There is trace to mild aortic valve regurgitation. The peak instantaneous gradient of the aortic valve is 5.3 mmHg. Mitral Valve: The mitral valve is mildly thickened. The mitral valve spectral Doppler A-wave is absent, consistent with atrial fibrillation. There is mild mitral annular calcification. There is mild mitral valve regurgitation.  Tricuspid Valve: The tricuspid valve is structurally normal. There is mild tricuspid regurgitation. The Doppler estimated RVSP is slightly elevated at 31.4 mmHg. Pulmonic Valve: The pulmonic valve is structurally normal. There is physiologic pulmonic valve regurgitation. Pericardium: There is no pericardial effusion noted. Aorta: The aortic root is normal. There is upper limits of normal dilatation of the ascending aorta. Systemic Veins: The inferior vena cava appears to be of normal size. In comparison to the previous echocardiogram(s): There are no prior studies on this patient for comparison purposes.  CONCLUSIONS:  1. Left ventricular systolic function is moderately decreased with a 30-35% estimated ejection fraction.  2. There is global hypokinesis of the left ventricle with minor regional variations.  3. Distal LV septum and LV apex are hypokinetic.  4. The patient is in atrial fibrillation which may influence the estimate of left ventricular function and transvalvular flows.  5. The left atrium is moderately dilated.  6. Absent A-wave on MV spectral Doppler tracing, consistent with atrial fibrillation.  7. There is reduced right ventricular systolic function.  8. Slightly elevated RVSP. QUANTITATIVE DATA SUMMARY: 2D MEASUREMENTS:                           Normal Ranges: Ao Root d:     3.60 cm    (2.0-3.7cm) LAs:           5.10 cm    (2.7-4.0cm) IVSd:          1.19 cm    (0.6-1.1cm) LVPWd:         1.11 cm    (0.6-1.1cm) LVIDd:         4.80 cm    (3.9-5.9cm) LVIDs:         4.07 cm LV Mass Index: 119.1 g/m2 LV % FS        15.2 % LA VOLUME:                               Normal Ranges: LA Vol A4C:        69.2 ml    (22+/-6mL/m2) LA Vol A2C:        91.1 ml LA Vol BP:         81.8 ml LA Vol Index A4C:  39.9ml/m2 LA Vol Index A2C:  52.6 ml/m2 LA Vol Index BP:   47.2 ml/m2 LA Area A4C:       23.7 cm2 LA Area A2C:       26.4 cm2 LA Major Axis A4C: 6.9 cm LA Major Axis A2C: 6.5 cm LA Volume Index:   43.4 ml/m2 LA Vol  A4C:        64.0 ml LA Vol A2C:        84.8 ml RA VOLUME BY A/L METHOD:                       Normal Ranges: RA Area A4C: 19.0 cm2 M-MODE MEASUREMENTS:                  Normal Ranges: Ao Root: 3.80 cm (2.0-3.7cm) LAs:     4.50 cm (2.7-4.0cm) AORTA MEASUREMENTS:                    Normal Ranges: Asc Ao, d: 3.80 cm (2.1-3.4cm) LV SYSTOLIC FUNCTION BY 2D PLANIMETRY (MOD):                     Normal Ranges: EF-A4C View: 32.6 % (>=55%) EF-A2C View: 48.8 % EF-Biplane:  37.9 % AORTIC VALVE:                         Normal Ranges: AoV Vmax:      1.15 m/s (<=1.7m/s) AoV Peak P.3 mmHg (<20mmHg) LVOT Max Rob:  0.49 m/s (<=1.1m/s) LVOT VTI:      6.87 cm LVOT Diameter: 2.20 cm  (1.8-2.4cm) AoV Area,Vmax: 1.63 cm2 (2.5-4.5cm2) AORTIC INSUFFICIENCY: AI Vmax:       2.39 m/s AI Half-time:  420 msec AI Decel Rate: 167.00 cm/s2  RIGHT VENTRICLE: RV Basal 3.51 cm RV Mid   1.90 cm RV Major 7.6 cm TAPSE:   12.2 mm RV s'    0.08 m/s TRICUSPID VALVE/RVSP:                             Normal Ranges: Peak TR Velocity: 2.57 m/s RV Syst Pressure: 31.4 mmHg (< 30mmHg) IVC Diam:         1.64 cm  [id number] Kael Real MD Electronically signed on 2024 at 4:48:12 PM  ** Final **     CT head wo IV contrast    Result Date: 2/15/2024  Interpreted By:  Weston Suarez, STUDY: CT HEAD WO IV CONTRAST;  2/15/2024 11:15 pm   INDICATION: Signs/Symptoms:ams.   COMPARISON: None.   ACCESSION NUMBER(S): [id number]   ORDERING CLINICIAN: JENNY MULLER   TECHNIQUE: Axial noncontrast CT images of the head.   FINDINGS: Gray-white matter differentiation is maintained. There are no extra-axial collections. No mass effect or midline shift. There is no hydrocephalus. There is prominence of the bifrontal extra-axial spaces and sylvian fissures which may represent focal cerebral volume loss.   HEMORRHAGE: No acute intracranial hemorrhage.   CALVARIUM: No depressed skull fracture.   EXTRACRANIAL SOFT TISSUES:... Unremarkable.   PARANASAL SINUSES/MASTOIDS: The  visualized paranasal sinuses are well aerated. Mastoid air cells are clear.   ORBITS: Grossly normal.       No acute intracranial hemorrhage or acute cortical infarct.     Signed by: Weston Suarez 2/15/2024 11:58 PM Dictation workstation:   [id number]    XR chest 1 view    Result Date: 2/15/2024  Interpreted By:  Weston Suarez, STUDY: XR CHEST 1 VIEW;  2/15/2024 10:18 pm   INDICATION: Signs/Symptoms:ams.   COMPARISON: None.   ACCESSION NUMBER(S): [id number]   ORDERING CLINICIAN: JENNY MULLER   FINDINGS:   CARDIOMEDIASTINAL SILHOUETTE: Cardiomediastinal silhouette is normal in size and configuration taking into account portable technique and patient rotation. There are post CABG changes.   LUNGS/PLEURA: There are no consolidations. There is mild blunting of the costophrenic angles suggestive of small bilateral pleural effusions.. There is no demonstrated pneumothorax.     BONES: No evidence of acute osseous abnormality.       1. Suggestion of small bilateral pleural effusions. No evidence of consolidation.     Signed by: Weston Suarez 2/15/2024 10:54 PM Dictation workstation:   [id number]         Assessment/Plan          Principal Problem:    Acute metabolic encephalopathy    Moshe is an 83-year-old male patient who presents to emergency department from skilled nursing facility for altered mental status.  Patient is currently alert and oriented, has a history of dementia.  Patient denies any complaints.  Patient was reportedly hypoxic at the nursing facility.  Patient has a history of A-fib and is on anticoagulation.  EKG in ED showing A-fib with a controlled rate.  Chest x-ray completed showing pleural effusions and interstitial edema without evidence of pneumonia or consolidation.  Antibiotics given in ED but held for admission.  Echocardiogram ordered for the morning as last echocardiogram on file was from 7/27/2021.  BNP elevated in the 700s.  IV fluids held due to elevated BNP, awaiting home medications.   Patient admitted for further medical management.     Acute metabolic encephalopathy/elevated troponin/HONG  See imaging results above  No evidence of pneumonia or consolidation    Hold IV fluids due to fluid overload  Trend troponin  Telemetry monitoring  Repeat labs in a.m.   Daily weight  Intake and output  urinalysis pending     A-fib/hypertension/CAD/BPH/lymphoma/diabetes/ulcerative colitis/GERD  Cardiac/diabetic diet  ISS  Hypoglycemia protocol  Telemetry monitoring   Continue home Xarelto when med rec is complete  Hold ACEs and ARB's 2/2 HONG     DVT Ppx  SCDs    continue home Xarelto when med rec is complete  PT/OT        I spent 35 minutes in the professional and overall care of this patient.     Patient fully evaluated and plan as above     Patient fully evaluated on February 16.  Continue to recheck urine and ammonia levels.  IV Rocephin given recheck labs in AM.      Patient fully evaluated on February 17.  Slightly more awake and alert.  Continue present antibiotic regimen.  Await final cultures.  Recheck labs in AM.     Patient fully evaluated on February 18.  Continue present antibiotic regimen.  Slightly more animated.  Oral intake is marginal and recheck labs in AM.  Remeron added for appetite stimulant.     Patient fully evaluated on February 19. Chest XR shows opacity. Awaiting results of chest CT. Potential for PEG for nocturnal enteral support, will discuss with family. Recheck labs in AM.    Patient evaluated on February 20. Chest CT shows bilateral effusions and emphysema with fibrosis. Awaiting pulmonary consult. Potential for PEG for nocturnal enteral support, will discuss with family. Awaiting SLP consult. Recheck labs in AM.           DEUCE CUELLAR

## 2024-02-20 NOTE — PROGRESS NOTES
Physical Therapy                 Therapy Communication Note    Patient Name: Moshe Velázquez  MRN: [id number]  Today's Date: 2/20/2024     Discipline: Physical Therapy    Missed Visit Reason: Missed Visit Reason:  (per conference w/ RN , defer PT eval at this time as pt w/ low arousal s/p Haldol)

## 2024-02-21 VITALS
HEART RATE: 92 BPM | HEIGHT: 72 IN | OXYGEN SATURATION: 92 % | SYSTOLIC BLOOD PRESSURE: 116 MMHG | DIASTOLIC BLOOD PRESSURE: 58 MMHG | TEMPERATURE: 96.8 F | WEIGHT: 112.43 LBS | BODY MASS INDEX: 15.23 KG/M2 | RESPIRATION RATE: 18 BRPM

## 2024-02-21 LAB
ALBUMIN SERPL BCP-MCNC: 2.7 G/DL (ref 3.4–5)
ALP SERPL-CCNC: 31 U/L (ref 33–136)
ALT SERPL W P-5'-P-CCNC: 28 U/L (ref 10–52)
ANION GAP SERPL CALC-SCNC: 8 MMOL/L (ref 10–20)
AST SERPL W P-5'-P-CCNC: 30 U/L (ref 9–39)
BILIRUB SERPL-MCNC: 1.5 MG/DL (ref 0–1.2)
BUN SERPL-MCNC: 16 MG/DL (ref 6–23)
CALCIUM SERPL-MCNC: 8.8 MG/DL (ref 8.6–10.3)
CHLORIDE SERPL-SCNC: 102 MMOL/L (ref 98–107)
CO2 SERPL-SCNC: 34 MMOL/L (ref 21–32)
CREAT SERPL-MCNC: 0.77 MG/DL (ref 0.5–1.3)
EGFRCR SERPLBLD CKD-EPI 2021: 89 ML/MIN/1.73M*2
ERYTHROCYTE [DISTWIDTH] IN BLOOD BY AUTOMATED COUNT: 16.9 % (ref 11.5–14.5)
GLUCOSE BLD MANUAL STRIP-MCNC: 102 MG/DL (ref 74–99)
GLUCOSE BLD MANUAL STRIP-MCNC: 104 MG/DL (ref 74–99)
GLUCOSE BLD MANUAL STRIP-MCNC: 91 MG/DL (ref 74–99)
GLUCOSE BLD MANUAL STRIP-MCNC: 94 MG/DL (ref 74–99)
GLUCOSE SERPL-MCNC: 91 MG/DL (ref 74–99)
HCT VFR BLD AUTO: 43 % (ref 41–52)
HGB BLD-MCNC: 14.9 G/DL (ref 13.5–17.5)
MAGNESIUM SERPL-MCNC: 1.39 MG/DL (ref 1.6–2.4)
MCH RBC QN AUTO: 33.3 PG (ref 26–34)
MCHC RBC AUTO-ENTMCNC: 34.7 G/DL (ref 32–36)
MCV RBC AUTO: 96 FL (ref 80–100)
NRBC BLD-RTO: 0 /100 WBCS (ref 0–0)
PLATELET # BLD AUTO: 175 X10*3/UL (ref 150–450)
POTASSIUM SERPL-SCNC: 4 MMOL/L (ref 3.5–5.3)
PROT SERPL-MCNC: 4.6 G/DL (ref 6.4–8.2)
RBC # BLD AUTO: 4.48 X10*6/UL (ref 4.5–5.9)
SODIUM SERPL-SCNC: 140 MMOL/L (ref 136–145)
WBC # BLD AUTO: 5.9 X10*3/UL (ref 4.4–11.3)

## 2024-02-21 PROCEDURE — 2500000004 HC RX 250 GENERAL PHARMACY W/ HCPCS (ALT 636 FOR OP/ED): Performed by: STUDENT IN AN ORGANIZED HEALTH CARE EDUCATION/TRAINING PROGRAM

## 2024-02-21 PROCEDURE — 2500000001 HC RX 250 WO HCPCS SELF ADMINISTERED DRUGS (ALT 637 FOR MEDICARE OP)

## 2024-02-21 PROCEDURE — 85027 COMPLETE CBC AUTOMATED: CPT | Performed by: INTERNAL MEDICINE

## 2024-02-21 PROCEDURE — C9113 INJ PANTOPRAZOLE SODIUM, VIA: HCPCS | Performed by: NURSE PRACTITIONER

## 2024-02-21 PROCEDURE — 2500000001 HC RX 250 WO HCPCS SELF ADMINISTERED DRUGS (ALT 637 FOR MEDICARE OP): Performed by: STUDENT IN AN ORGANIZED HEALTH CARE EDUCATION/TRAINING PROGRAM

## 2024-02-21 PROCEDURE — 80053 COMPREHEN METABOLIC PANEL: CPT | Performed by: INTERNAL MEDICINE

## 2024-02-21 PROCEDURE — 36415 COLL VENOUS BLD VENIPUNCTURE: CPT | Performed by: INTERNAL MEDICINE

## 2024-02-21 PROCEDURE — 83735 ASSAY OF MAGNESIUM: CPT | Performed by: STUDENT IN AN ORGANIZED HEALTH CARE EDUCATION/TRAINING PROGRAM

## 2024-02-21 PROCEDURE — 97161 PT EVAL LOW COMPLEX 20 MIN: CPT | Mod: GP

## 2024-02-21 PROCEDURE — 2500000004 HC RX 250 GENERAL PHARMACY W/ HCPCS (ALT 636 FOR OP/ED): Performed by: NURSE PRACTITIONER

## 2024-02-21 PROCEDURE — 92526 ORAL FUNCTION THERAPY: CPT | Mod: GN

## 2024-02-21 PROCEDURE — 2500000001 HC RX 250 WO HCPCS SELF ADMINISTERED DRUGS (ALT 637 FOR MEDICARE OP): Performed by: INTERNAL MEDICINE

## 2024-02-21 PROCEDURE — 1100000001 HC PRIVATE ROOM DAILY

## 2024-02-21 PROCEDURE — 97165 OT EVAL LOW COMPLEX 30 MIN: CPT | Mod: GO

## 2024-02-21 PROCEDURE — 2500000004 HC RX 250 GENERAL PHARMACY W/ HCPCS (ALT 636 FOR OP/ED): Performed by: INTERNAL MEDICINE

## 2024-02-21 PROCEDURE — 82947 ASSAY GLUCOSE BLOOD QUANT: CPT

## 2024-02-21 RX ORDER — CEFUROXIME AXETIL 250 MG/1
250 TABLET ORAL 2 TIMES DAILY
Start: 2024-02-21

## 2024-02-21 RX ORDER — ESCITALOPRAM OXALATE 10 MG/1
10 TABLET ORAL DAILY
Status: DISCONTINUED | OUTPATIENT
Start: 2024-02-21 | End: 2024-02-22 | Stop reason: HOSPADM

## 2024-02-21 RX ORDER — BISACODYL 5 MG
5 TABLET, DELAYED RELEASE (ENTERIC COATED) ORAL DAILY PRN
Qty: 30 TABLET | Refills: 0 | Status: SHIPPED | OUTPATIENT
Start: 2024-02-21

## 2024-02-21 RX ORDER — DOCUSATE SODIUM 100 MG/1
100 CAPSULE, LIQUID FILLED ORAL 2 TIMES DAILY
Start: 2024-02-21

## 2024-02-21 RX ORDER — ALBUTEROL SULFATE 0.83 MG/ML
3 SOLUTION RESPIRATORY (INHALATION) EVERY 2 HOUR PRN
Qty: 75 ML | Refills: 11 | Status: SHIPPED | OUTPATIENT
Start: 2024-02-21

## 2024-02-21 RX ORDER — MAGNESIUM SULFATE HEPTAHYDRATE 40 MG/ML
4 INJECTION, SOLUTION INTRAVENOUS ONCE
Status: COMPLETED | OUTPATIENT
Start: 2024-02-21 | End: 2024-02-21

## 2024-02-21 RX ADMIN — PANTOPRAZOLE SODIUM 40 MG: 40 INJECTION, POWDER, FOR SOLUTION INTRAVENOUS at 20:28

## 2024-02-21 RX ADMIN — MAGNESIUM SULFATE HEPTAHYDRATE 4 G: 40 INJECTION, SOLUTION INTRAVENOUS at 09:45

## 2024-02-21 RX ADMIN — MIRTAZAPINE 15 MG: 15 TABLET, FILM COATED ORAL at 20:27

## 2024-02-21 RX ADMIN — PANTOPRAZOLE SODIUM 40 MG: 40 INJECTION, POWDER, FOR SOLUTION INTRAVENOUS at 08:53

## 2024-02-21 RX ADMIN — CEFUROXIME AXETIL 250 MG: 250 TABLET, FILM COATED ORAL at 08:53

## 2024-02-21 RX ADMIN — DEXTROSE AND SODIUM CHLORIDE 100 ML/HR: 5; 450 INJECTION, SOLUTION INTRAVENOUS at 03:39

## 2024-02-21 RX ADMIN — CEFUROXIME AXETIL 250 MG: 250 TABLET, FILM COATED ORAL at 20:27

## 2024-02-21 RX ADMIN — ESCITALOPRAM OXALATE 10 MG: 10 TABLET ORAL at 08:57

## 2024-02-21 RX ADMIN — DOCUSATE SODIUM 100 MG: 100 CAPSULE, LIQUID FILLED ORAL at 20:27

## 2024-02-21 ASSESSMENT — COGNITIVE AND FUNCTIONAL STATUS - GENERAL
EATING MEALS: A LITTLE
MOVING TO AND FROM BED TO CHAIR: A LITTLE
HELP NEEDED FOR BATHING: A LOT
DAILY ACTIVITIY SCORE: 12
TURNING FROM BACK TO SIDE WHILE IN FLAT BAD: A LOT
DRESSING REGULAR LOWER BODY CLOTHING: A LOT
TOILETING: TOTAL
DRESSING REGULAR UPPER BODY CLOTHING: A LITTLE
CLIMB 3 TO 5 STEPS WITH RAILING: A LITTLE
WALKING IN HOSPITAL ROOM: A LITTLE
MOVING FROM LYING ON BACK TO SITTING ON SIDE OF FLAT BED WITH BEDRAILS: A LITTLE
DRESSING REGULAR LOWER BODY CLOTHING: A LOT
TOILETING: A LOT
HELP NEEDED FOR BATHING: A LOT
STANDING UP FROM CHAIR USING ARMS: A LOT
MOBILITY SCORE: 16
DAILY ACTIVITIY SCORE: 14
DRESSING REGULAR UPPER BODY CLOTHING: A LOT
PERSONAL GROOMING: A LOT
EATING MEALS: A LOT
PERSONAL GROOMING: A LITTLE

## 2024-02-21 ASSESSMENT — PAIN - FUNCTIONAL ASSESSMENT: PAIN_FUNCTIONAL_ASSESSMENT: 0-10

## 2024-02-21 ASSESSMENT — PAIN SCALES - GENERAL: PAINLEVEL_OUTOF10: 0 - NO PAIN

## 2024-02-21 NOTE — DISCHARGE SUMMARY
Discharge Diagnosis  Acute metabolic encephalopathy    Issues Requiring Follow-Up  Patient ready for discharge.     Discharge Meds     Your medication list        START taking these medications        Instructions Last Dose Given Next Dose Due   albuterol 2.5 mg /3 mL (0.083 %) nebulizer solution      Take 3 mL by nebulization every 2 hours if needed for wheezing.       cefuroxime 250 mg tablet  Commonly known as: Ceftin      Take 1 tablet (250 mg) by mouth 2 times a day.       docusate sodium 100 mg capsule  Commonly known as: Colace      Take 1 capsule (100 mg) by mouth 2 times a day.       oxygen gas therapy  Commonly known as: O2      Inhale 2 L/min once every 24 hours.       tamsulosin 0.4 mg 24 hr capsule  Commonly known as: Flomax      TAKE 1 CAPSULE BY MOUTH EVERY DAY              CHANGE how you take these medications        Instructions Last Dose Given Next Dose Due   Dulcolax (bisacodyl) 10 mg suppository  Generic drug: bisacodyl  What changed: Another medication with the same name was added. Make sure you understand how and when to take each.           bisacodyl 5 mg EC tablet  Commonly known as: Dulcolax  What changed: You were already taking a medication with the same name, and this prescription was added. Make sure you understand how and when to take each.      Take 1 tablet (5 mg) by mouth once daily as needed for constipation. Do not crush, chew, or split.              CONTINUE taking these medications        Instructions Last Dose Given Next Dose Due   acetaminophen 325 mg tablet  Commonly known as: Tylenol           alum-mag hydroxide-simeth 200-200-20 mg/5 mL oral suspension  Commonly known as: Mylanta           CENTRUM SILVER ORAL           escitalopram 10 mg tablet  Commonly known as: Lexapro      Take 1 tablet (10 mg) by mouth once daily.       fenofibrate 160 mg tablet  Commonly known as: Triglide           fluticasone 50 mcg/actuation nasal spray  Commonly known as: Flonase      Administer 1  spray into each nostril once daily. Shake gently. Before first use, prime pump. After use, clean tip and replace cap.       lisinopril 20 mg tablet           magnesium hydroxide 400 mg/5 mL suspension  Commonly known as: Milk of Magnesia           metoprolol succinate XL 50 mg 24 hr tablet  Commonly known as: Toprol-XL           mirtazapine 7.5 mg tablet  Commonly known as: Remeron           nitroglycerin 0.4 mg SL tablet  Commonly known as: Nitrostat           omeprazole OTC 20 mg EC tablet  Commonly known as: PriLOSEC OTC           simvastatin 20 mg tablet  Commonly known as: Zocor           spironolactone 50 mg tablet  Commonly known as: Aldactone      Take 1 tablet (50 mg) by mouth once daily for 7 days.       torsemide 20 mg tablet  Commonly known as: Demadex      Take 1 tablet (20 mg) by mouth once daily for 7 days.       traZODone 100 mg tablet  Commonly known as: Desyrel      Take 1 tablet (100 mg) by mouth as needed at bedtime for sleep.       Xarelto 20 mg tablet  Generic drug: rivaroxaban                     Where to Get Your Medications        These medications were sent to [place] [id number]      Phone: [phone]   albuterol 2.5 mg /3 mL (0.083 %) nebulizer solution  bisacodyl 5 mg EC tablet       Information about where to get these medications is not yet available    Ask your nurse or doctor about these medications  cefuroxime 250 mg tablet  docusate sodium 100 mg capsule  oxygen gas therapy         Test Results Pending At Discharge  Pending Labs       No current pending labs.            Hospital Course   Last Recorded Vitals  /89   Pulse (!) 140   Temp 36 °C (96.8 °F)   Resp 18   Wt 50.5 kg (111 lb 5.3 oz)   SpO2 94%   Intake/Output last 3 Shifts:     Intake/Output Summary (Last 24 hours) at 2/20/2024 1333  Last data filed at 2/19/2024 1900      Gross per 24 hour   Intake --   Output 525 ml   Net  -525 ml         Admission Weight  Weight: 55.8 kg (123 lb) (02/15/24 2200)     Daily Weight  02/20/24 : 50.5 kg (111 lb 5.3 oz)     Image Results  CT chest w IV contrast  Narrative: Interpreted By:  Bahman Laughlin,   STUDY:  CT CHEST W IV CONTRAST;  2/19/2024 8:00 pm      INDICATION:  Signs/Symptoms:lung mass.      COMPARISON:  None.      ACCESSION NUMBER(S):  [id number]      ORDERING CLINICIAN:  SALVADOR HIRSCH      TECHNIQUE:  Helical data acquisition of the chest was obtained  Scans were  obtained after the intravenous administration of 75 milliliters of  Omnipaque 350 contrast material..  Images were reformatted in axial,  coronal, and sagittal planes.      FINDINGS:  LUNGS and AIRWAYS:  There are relatively small bilateral pleural effusions, but which do  extend within the major fissures and would account for lung opacities  on the previous day's exam. There is also basilar compressive  atelectasis. Mild emphysema, with subpleural reticular prominence at  the lower lungs predominantly posteriorly with subpleural cysts  indicating fibrosis. There are calcified plaques posteriorly and  diaphragmatic probably from asbestosis exposure.      MEDIASTINUM and YC, LOWER NECK AND AXILLA:  The visualized thyroid gland is within normal limits.      No evidence of thoracic lymphadenopathy by CT criteria.      HEART and VESSELS:  The thoracic aorta is of normal course and caliber without  significant atherosclerotic calcification .      Main pulmonary artery and its branches are normal in caliber.      Coronary artery calcification is present, patient is however status  post median sternotomy.      The cardiac chambers are not enlarged.      UPPER ABDOMEN:  There is a densely peripherally calcified exophytic right upper renal  pole cyst. CT renal protocol would be recommended if further  evaluation for Bosniak classification and malignant risk is needed.      CHEST WALL, OSSEOUS STRUCTURES AND OTHER FINDINGS:  There are no  suspicious osseous lesions.      Impression: 1.  Bilateral effusions as described.  2. Emphysema with fibrosis. Calcified pleural plaques.  3. Right renal cysts with capsular calcification.      Signed by: Bahman Laughlin 2/20/2024 10:03 AM  Dictation workstation:   [id number]        Physical Exam  Constitutional:       Appearance: Normal appearance.   HENT:      Mouth/Throat:      Mouth: Mucous membranes are dry.      Pharynx: Oropharynx is clear.   Cardiovascular:      Rate and Rhythm: Rhythm irregularly irregular.   Pulmonary:      Comments: Mildly diminished breath sounds  Abdominal:      General: Bowel sounds are normal.      Palpations: Abdomen is soft.   Musculoskeletal:         General: Normal range of motion.      Cervical back: Normal range of motion.   Skin:     General: Skin is warm and dry.      Capillary Refill: Capillary refill takes less than 2 seconds.   Neurological:      General: No focal deficit present.      Mental Status: He is alert. Mental status is at baseline.   Psychiatric:         Mood and Affect: Mood normal.      Relevant Results  Scheduled medications  [Held by provider] azithromycin, 500 mg, intravenous, q24h  cefuroxime, 250 mg, oral, BID  midodrine, 5 mg, oral, TID  mirtazapine, 15 mg, oral, Nightly  pantoprazole, 40 mg, intravenous, BID  [Held by provider] piperacillin-tazobactam, 3.375 g, intravenous, q6h        Continuous medications  dextrose 5%-0.45 % sodium chloride, 100 mL/hr, Last Rate: 100 mL/hr (02/19/24 1219)        PRN medications  PRN medications: albuterol, oxygen            Results for orders placed or performed during the hospital encounter of 02/15/24 (from the past 24 hour(s))   POCT GLUCOSE   Result Value Ref Range     POCT Glucose 131 (H) 74 - 99 mg/dL   CBC   Result Value Ref Range     WBC 6.7 4.4 - 11.3 x10*3/uL     nRBC 0.0 0.0 - 0.0 /100 WBCs     RBC 4.49 (L) 4.50 - 5.90 x10*6/uL     Hemoglobin 14.8 13.5 - 17.5 g/dL     Hematocrit 42.4 41.0 - 52.0 %     MCV 94  80 - 100 fL     MCH 33.0 26.0 - 34.0 pg     MCHC 34.9 32.0 - 36.0 g/dL     RDW 16.3 (H) 11.5 - 14.5 %     Platelets 190 150 - 450 x10*3/uL   Comprehensive Metabolic Panel   Result Value Ref Range     Glucose 96 74 - 99 mg/dL     Sodium 140 136 - 145 mmol/L     Potassium 3.8 3.5 - 5.3 mmol/L     Chloride 101 98 - 107 mmol/L     Bicarbonate 34 (H) 21 - 32 mmol/L     Anion Gap 9 (L) 10 - 20 mmol/L     Urea Nitrogen 19 6 - 23 mg/dL     Creatinine 0.67 0.50 - 1.30 mg/dL     eGFR >90 >60 mL/min/1.73m*2     Calcium 9.0 8.6 - 10.3 mg/dL     Albumin 2.9 (L) 3.4 - 5.0 g/dL     Alkaline Phosphatase 30 (L) 33 - 136 U/L     Total Protein 4.7 (L) 6.4 - 8.2 g/dL     AST 27 9 - 39 U/L     Bilirubin, Total 1.4 (H) 0.0 - 1.2 mg/dL     ALT 27 10 - 52 U/L   POCT GLUCOSE   Result Value Ref Range     POCT Glucose 108 (H) 74 - 99 mg/dL   POCT GLUCOSE   Result Value Ref Range     POCT Glucose 104 (H) 74 - 99 mg/dL         CT chest w IV contrast     Result Date: 2/20/2024  Interpreted By:  Bahman Laughlin, STUDY: CT CHEST W IV CONTRAST;  2/19/2024 8:00 pm   INDICATION: Signs/Symptoms:lung mass.   COMPARISON: None.   ACCESSION NUMBER(S): [id number]   ORDERING CLINICIAN: SALVADOR HIRSCH   TECHNIQUE: Helical data acquisition of the chest was obtained  Scans were obtained after the intravenous administration of 75 milliliters of Omnipaque 350 contrast material..  Images were reformatted in axial, coronal, and sagittal planes.   FINDINGS: LUNGS and AIRWAYS: There are relatively small bilateral pleural effusions, but which do extend within the major fissures and would account for lung opacities on the previous day's exam. There is also basilar compressive atelectasis. Mild emphysema, with subpleural reticular prominence at the lower lungs predominantly posteriorly with subpleural cysts indicating fibrosis. There are calcified plaques posteriorly and diaphragmatic probably from asbestosis exposure.   MEDIASTINUM and CY, LOWER NECK AND AXILLA: The  visualized thyroid gland is within normal limits.   No evidence of thoracic lymphadenopathy by CT criteria.   HEART and VESSELS: The thoracic aorta is of normal course and caliber without significant atherosclerotic calcification .   Main pulmonary artery and its branches are normal in caliber.   Coronary artery calcification is present, patient is however status post median sternotomy.   The cardiac chambers are not enlarged.   UPPER ABDOMEN: There is a densely peripherally calcified exophytic right upper renal pole cyst. CT renal protocol would be recommended if further evaluation for Bosniak classification and malignant risk is needed.   CHEST WALL, OSSEOUS STRUCTURES AND OTHER FINDINGS: There are no suspicious osseous lesions.        1.  Bilateral effusions as described. 2. Emphysema with fibrosis. Calcified pleural plaques. 3. Right renal cysts with capsular calcification.   Signed by: Bahman Laughlin 2/20/2024 10:03 AM Dictation workstation:   [id number]     XR chest 1 view     Result Date: 2/18/2024  Interpreted By:  Rupert Eugene, STUDY: XR CHEST 1 VIEW;  2/18/2024 11:34 pm   INDICATION: Signs/Symptoms:Possible aspiration.   COMPARISON: 2/15/2024   ACCESSION NUMBER(S): [id number]   ORDERING CLINICIAN: BIANKA FARRIS   FINDINGS: Postsurgical change with sternotomy wires. There is stable cardiomegaly. There is interval bone opacity right mid and lower lung. There is small right pleural effusion. There is mild left basilar opacity and small left pleural effusion. No pneumothorax.        Interval development of opacity in the right mid and lower lung which may correspond to pneumonia. There are small pleural effusions and also mild left basilar atelectasis. Given the focal nature of the opacity in the right lung, short-term progress imaging is however recommended after treatment to assure resolution and exclude other pathology including mass.   MACRO: None   Signed by: Rupert Eugene 2/18/2024 11:52 PM  Dictation workstation:   [id number]     ECG 12 lead     Result Date: 2/16/2024  Atrial fibrillation Anteroseptal infarct, old Repol abnrm suggests ischemia, diffuse leads See ED provider note for full interpretation and clinical correlation Confirmed by Charisma Díaz ([id number]) on 2/16/2024 5:30:36 PM     Transthoracic Echo (TTE) Complete     Result Date: 2/16/2024    [facility], [address] [place] [id number] [phone] and                                 Fax [phone] TRANSTHORACIC ECHOCARDIOGRAM REPORT  Patient Name:      VICTOR M MATHIAS       Reading Physician:    [id number] Kael Real MD Study Date:        2/16/2024            Ordering Provider:    [id number] REJI WASHINGTON MRN/PID:           [id number]             Fellow: Accession#:        [id number]         Nurse:                Grace Matamoros RN Date of Birth/Age: 1940 / 83 years Sonographer:          Lorna Olivares RDCS Gender:            M                    Additional Staff: Height:            182.88 cm            Admit Date:           2/16/2024 Weight:            55.79 kg             Admission Status:     Inpatient -                                                               Routine BSA / BMI:         1.73 m2 / 16.68      Encounter#:           [id number]                    kg/m2                                         Department Location:  [facility] Blood Pressure: 112 /71 mmHg Study Type:    TRANSTHORACIC ECHO (TTE) COMPLETE Diagnosis/ICD: Other fluid overload-E87.79 Indication:    A-Fib AMS CPT Code:      Echo Complete w Full Doppler-[id number] Patient History: Pertinent History: CAD, PVD, A-Fib, HTN and Hyperlipidemia. Hx CABG, DM. Study Detail: The following Echo  studies were performed: 2D, M-Mode, Doppler and               color flow. Technically challenging study due to patient lying in               supine position and body habitus. Definity used as a contrast               agent for endocardial border definition. Total contrast used for               this procedure was 2 mL via IV push.  PHYSICIAN INTERPRETATION: Left Ventricle: The left ventricular systolic function is moderately decreased, with an estimated ejection fraction of 30-35%. The patient is in atrial fibrillation which may influence the estimate of left ventricular function and transvalvular flows. There is global hypokinesis of the left ventricle with minor regional variations. The left ventricular cavity size is normal. Left ventricular diastolic filling was indeterminate. Distal LV septum and LV apex are hypokinetic. Left Atrium: The left atrium is moderately dilated. Right Ventricle: The right ventricle is normal in size. There is reduced right ventricular systolic function. Right Atrium: The right atrium is mildly dilated. Aortic Valve: The aortic valve is trileaflet. There is mild aortic valve cusp calcification. There is mild aortic valve thickening. There is trace to mild aortic valve regurgitation. The peak instantaneous gradient of the aortic valve is 5.3 mmHg. Mitral Valve: The mitral valve is mildly thickened. The mitral valve spectral Doppler A-wave is absent, consistent with atrial fibrillation. There is mild mitral annular calcification. There is mild mitral valve regurgitation. Tricuspid Valve: The tricuspid valve is structurally normal. There is mild tricuspid regurgitation. The Doppler estimated RVSP is slightly elevated at 31.4 mmHg. Pulmonic Valve: The pulmonic valve is structurally normal. There is physiologic pulmonic valve regurgitation. Pericardium: There is no pericardial effusion noted. Aorta: The aortic root is normal. There is upper limits of normal dilatation of the ascending  aorta. Systemic Veins: The inferior vena cava appears to be of normal size. In comparison to the previous echocardiogram(s): There are no prior studies on this patient for comparison purposes.  CONCLUSIONS:  1. Left ventricular systolic function is moderately decreased with a 30-35% estimated ejection fraction.  2. There is global hypokinesis of the left ventricle with minor regional variations.  3. Distal LV septum and LV apex are hypokinetic.  4. The patient is in atrial fibrillation which may influence the estimate of left ventricular function and transvalvular flows.  5. The left atrium is moderately dilated.  6. Absent A-wave on MV spectral Doppler tracing, consistent with atrial fibrillation.  7. There is reduced right ventricular systolic function.  8. Slightly elevated RVSP. QUANTITATIVE DATA SUMMARY: 2D MEASUREMENTS:                           Normal Ranges: Ao Root d:     3.60 cm    (2.0-3.7cm) LAs:           5.10 cm    (2.7-4.0cm) IVSd:          1.19 cm    (0.6-1.1cm) LVPWd:         1.11 cm    (0.6-1.1cm) LVIDd:         4.80 cm    (3.9-5.9cm) LVIDs:         4.07 cm LV Mass Index: 119.1 g/m2 LV % FS        15.2 % LA VOLUME:                               Normal Ranges: LA Vol A4C:        69.2 ml    (22+/-6mL/m2) LA Vol A2C:        91.1 ml LA Vol BP:         81.8 ml LA Vol Index A4C:  39.9ml/m2 LA Vol Index A2C:  52.6 ml/m2 LA Vol Index BP:   47.2 ml/m2 LA Area A4C:       23.7 cm2 LA Area A2C:       26.4 cm2 LA Major Axis A4C: 6.9 cm LA Major Axis A2C: 6.5 cm LA Volume Index:   43.4 ml/m2 LA Vol A4C:        64.0 ml LA Vol A2C:        84.8 ml RA VOLUME BY A/L METHOD:                       Normal Ranges: RA Area A4C: 19.0 cm2 M-MODE MEASUREMENTS:                  Normal Ranges: Ao Root: 3.80 cm (2.0-3.7cm) LAs:     4.50 cm (2.7-4.0cm) AORTA MEASUREMENTS:                    Normal Ranges: Asc Ao, d: 3.80 cm (2.1-3.4cm) LV SYSTOLIC FUNCTION BY 2D PLANIMETRY (MOD):                     Normal Ranges: EF-A4C View:  32.6 % (>=55%) EF-A2C View: 48.8 % EF-Biplane:  37.9 % AORTIC VALVE:                         Normal Ranges: AoV Vmax:      1.15 m/s (<=1.7m/s) AoV Peak P.3 mmHg (<20mmHg) LVOT Max Rob:  0.49 m/s (<=1.1m/s) LVOT VTI:      6.87 cm LVOT Diameter: 2.20 cm  (1.8-2.4cm) AoV Area,Vmax: 1.63 cm2 (2.5-4.5cm2) AORTIC INSUFFICIENCY: AI Vmax:       2.39 m/s AI Half-time:  420 msec AI Decel Rate: 167.00 cm/s2  RIGHT VENTRICLE: RV Basal 3.51 cm RV Mid   1.90 cm RV Major 7.6 cm TAPSE:   12.2 mm RV s'    0.08 m/s TRICUSPID VALVE/RVSP:                             Normal Ranges: Peak TR Velocity: 2.57 m/s RV Syst Pressure: 31.4 mmHg (< 30mmHg) IVC Diam:         1.64 cm  [id number] Kael Real MD Electronically signed on 2024 at 4:48:12 PM  ** Final **      CT head wo IV contrast     Result Date: 2/15/2024  Interpreted By:  Weston Suarez, STUDY: CT HEAD WO IV CONTRAST;  2/15/2024 11:15 pm   INDICATION: Signs/Symptoms:ams.   COMPARISON: None.   ACCESSION NUMBER(S): [id number]   ORDERING CLINICIAN: JENNY MULLER   TECHNIQUE: Axial noncontrast CT images of the head.   FINDINGS: Gray-white matter differentiation is maintained. There are no extra-axial collections. No mass effect or midline shift. There is no hydrocephalus. There is prominence of the bifrontal extra-axial spaces and sylvian fissures which may represent focal cerebral volume loss.   HEMORRHAGE: No acute intracranial hemorrhage.   CALVARIUM: No depressed skull fracture.   EXTRACRANIAL SOFT TISSUES:... Unremarkable.   PARANASAL SINUSES/MASTOIDS: The visualized paranasal sinuses are well aerated. Mastoid air cells are clear.   ORBITS: Grossly normal.        No acute intracranial hemorrhage or acute cortical infarct.     Signed by: Weston Suarez 2/15/2024 11:58 PM Dictation workstation:   [id number]     XR chest 1 view     Result Date: 2/15/2024  Interpreted By:  Weston Suarez, STUDY: XR CHEST 1 VIEW;  2/15/2024 10:18 pm   INDICATION: Signs/Symptoms:ams.    COMPARISON: None.   ACCESSION NUMBER(S): [id number]   ORDERING CLINICIAN: JENNY MULLER   FINDINGS:   CARDIOMEDIASTINAL SILHOUETTE: Cardiomediastinal silhouette is normal in size and configuration taking into account portable technique and patient rotation. There are post CABG changes.   LUNGS/PLEURA: There are no consolidations. There is mild blunting of the costophrenic angles suggestive of small bilateral pleural effusions.. There is no demonstrated pneumothorax.     BONES: No evidence of acute osseous abnormality.        1. Suggestion of small bilateral pleural effusions. No evidence of consolidation.     Signed by: Weston Suarez 2/15/2024 10:54 PM Dictation workstation:   [id number]                Assessment/Plan         Principal Problem:    Acute metabolic encephalopathy     Moshe is an 83-year-old male patient who presents to emergency department from skilled nursing facility for altered mental status.  Patient is currently alert and oriented, has a history of dementia.  Patient denies any complaints.  Patient was reportedly hypoxic at the nursing facility.  Patient has a history of A-fib and is on anticoagulation.  EKG in ED showing A-fib with a controlled rate.  Chest x-ray completed showing pleural effusions and interstitial edema without evidence of pneumonia or consolidation.  Antibiotics given in ED but held for admission.  Echocardiogram ordered for the morning as last echocardiogram on file was from 7/27/2021.  BNP elevated in the 700s.  IV fluids held due to elevated BNP, awaiting home medications.  Patient admitted for further medical management.     Acute metabolic encephalopathy/elevated troponin/HONG  See imaging results above  No evidence of pneumonia or consolidation    Hold IV fluids due to fluid overload  Trend troponin  Telemetry monitoring  Repeat labs in a.m.   Daily weight  Intake and output  urinalysis pending     A-fib/hypertension/CAD/BPH/lymphoma/diabetes/ulcerative  colitis/GERD  Cardiac/diabetic diet  ISS  Hypoglycemia protocol  Telemetry monitoring   Continue home Xarelto when med rec is complete  Hold ACEs and ARB's 2/2 HONG     DVT Ppx  SCDs    continue home Xarelto when med rec is complete  PT/OT        I spent 35 minutes in the professional and overall care of this patient.     Patient fully evaluated and plan as above     Patient fully evaluated on February 16.  Continue to recheck urine and ammonia levels.  IV Rocephin given recheck labs in AM.      Patient fully evaluated on February 17.  Slightly more awake and alert.  Continue present antibiotic regimen.  Await final cultures.  Recheck labs in AM.     Patient fully evaluated on February 18.  Continue present antibiotic regimen.  Slightly more animated.  Oral intake is marginal and recheck labs in AM.  Remeron added for appetite stimulant.     Patient fully evaluated on February 19. Chest XR shows opacity. Awaiting results of chest CT. Potential for PEG for nocturnal enteral support, will discuss with family. Recheck labs in AM.     Patient evaluated on February 20. Chest CT shows bilateral effusions and emphysema with fibrosis. Awaiting pulmonary consult. Potential for PEG for nocturnal enteral support, will discuss with family. Awaiting SLP consult. Recheck labs in AM.   Patient fully evaluated on February 21. Pulmonary consult obtained with no plans of intervention. Patient ready for discharge to nursing home.    Pertinent Physical Exam At Time of Discharge  Physical Exam  Constitutional:       Appearance: Normal appearance.   HENT:      Mouth/Throat:      Mouth: Mucous membranes are dry.      Pharynx: Oropharynx is clear.   Cardiovascular:      Rate and Rhythm: Rhythm irregularly irregular.   Pulmonary:      Comments: Mildly diminished breath sounds  Abdominal:      General: Bowel sounds are normal.      Palpations: Abdomen is soft.   Musculoskeletal:         General: Normal range of motion.      Cervical back:  Normal range of motion.   Skin:     General: Skin is warm and dry.      Capillary Refill: Capillary refill takes less than 2 seconds.   Neurological:      General: No focal deficit present.      Mental Status: He is alert. Mental status is at baseline.   Psychiatric:         Mood and Affect: Mood normal.     Outpatient Follow-Up  No future appointments.    Patient fully evaluated on February 21 and more animated.  Patient refused any type of artificial feeding.  Goals of care discussion with daughter and election for no further aggressive therapy including PEG feeding tube.  Greater than 45 minutes spent on discharge  DEUCE CUELLAR

## 2024-02-21 NOTE — CONSULTS
Pulmonary Critical Care note    Patient Name :Moshe Velázquez  Date of service : 02/21/24  MRN: [id number]  YOB: 1940      History of Present Illness:   83 year old male with afib, history of DVT/PE, B-cell lymphoma who presented for AMS and transient hypoxia. History supplemented from chart. He reports of dyspnea with exertion and mild intermittent cough for the past year. His cough is occasionally slightly productive. He has had poor oral intake since last year due to lack of appetite with resulting weight loss. Denies dysphagia or choking. Denies chest pain, leg swelling, nausea, vomiting, fever, chills. He admits to exposure to asbestos while in the [employer].    He was 98% on room air in the ED. Labs with HCO3 34, HONG, procal 0.1, , normal lactate. Flu/COVID negative. CXR 2/15 with small bilateral pleural effusions. He was started on antibiotics. He has been intermittently on a small amount of supplemental oxygen, currently on room air.   CT chest 2/19 with small bilateral pleural effusions including loculated fluid tracking along fissures, basilar fibrosis, calcified plaques.   Pulmonology consulted for pneumonia.        Past Medical/Surgical History:   Past Medical History:   Diagnosis Date    Diverticulosis of intestine, part unspecified, without perforation or abscess without bleeding     Diverticulosis    Other hemorrhoids     Internal hemorrhoids    Personal history of colonic polyps     History of colonic polyps    Personal history of other diseases of the respiratory system     History of sinusitis    Personal history of other venous thrombosis and embolism     History of deep venous thrombosis    Personal history of pulmonary embolism     Personal history of pulmonary embolism   CAD, GERD, HTN, AVM of upper GI tract, BPH, insomnia, GERD, NHL, DM, afib  Past Surgical History:   Procedure Laterality Date    CHOLECYSTECTOMY  06/23/2014    Cholecystectomy    TONSILLECTOMY  06/23/2014     Tonsillectomy   CABG, bowel resection    Family History:   Breast cancer (mother, daughter)    Allergies:     Allergies   Allergen Reactions    Dilaudid [Hydromorphone] Unknown         Social history:   reports that he has been smoking cigarettes. He has never used smokeless tobacco. He reports that he does not currently use alcohol.    Review of Systems:   10 point ROS completed and negative except as in HPI    Physical Exam:   Vital Signs:   Vitals:    02/21/24 0805   BP: 125/68   Pulse:    Resp:    Temp: 36.2 °C (97.2 °F)   SpO2: 94%     Vitals:    02/21/24 0600   Weight: 51 kg (112 lb 7 oz)         Input/Output:    Intake/Output Summary (Last 24 hours) at 2/21/2024 1034  Last data filed at 2/21/2024 0558  Gross per 24 hour   Intake 1400 ml   Output 700 ml   Net 700 ml       Oxygen requirements:    Ventilator Information:     Oxygen Therapy/Pulse Ox  Medical Gas Therapy: None (Room air)  O2 Delivery Method: Nasal cannula  SpO2: 94 %  Patient Activity During SpO2 Measurement: At rest  Temp: 36.2 °C (97.2 °F)      General:  Thin, frail. No apparent distress.  HEENT:  Normocephalic, atraumatic  Chest:  Crackles at bases. Non-labored breathing.  CV:  Regular rate, irreg irreg rhythm  Abdomen: Abdomen is soft, non-tender, non-distended.   Extremities:  No lower extremity edema. No clubbing.  Neurological: Alert. Moves all extremities.  Skin:  Warm and dry.    Investigations:  Labs, radiological imaging and cardiac work up were personally reviewed    Results from last 7 days   Lab Units 02/21/24  0659 02/20/24  0645 02/19/24  0751   SODIUM mmol/L 140 140 140   POTASSIUM mmol/L 4.0 3.8 4.2   CHLORIDE mmol/L 102 101 101   CO2 mmol/L 34* 34* 35*   BUN mg/dL 16 19 23   CREATININE mg/dL 0.77 0.67 0.76   GLUCOSE mg/dL 91 96 95   CALCIUM mg/dL 8.8 9.0 9.2     Results from last 7 days   Lab Units 02/21/24  0659   WBC AUTO x10*3/uL 5.9   HEMOGLOBIN g/dL 14.9   HEMATOCRIT % 43.0   PLATELETS AUTO x10*3/uL 175         CT chest w  IV contrast    Result Date: 2/20/2024  Interpreted By:  Bahman Laughlin, STUDY: CT CHEST W IV CONTRAST;  2/19/2024 8:00 pm   INDICATION: Signs/Symptoms:lung mass.   COMPARISON: None.   ACCESSION NUMBER(S): [id number]   ORDERING CLINICIAN: SALVADOR HIRSCH   TECHNIQUE: Helical data acquisition of the chest was obtained  Scans were obtained after the intravenous administration of 75 milliliters of Omnipaque 350 contrast material..  Images were reformatted in axial, coronal, and sagittal planes.   FINDINGS: LUNGS and AIRWAYS: There are relatively small bilateral pleural effusions, but which do extend within the major fissures and would account for lung opacities on the previous day's exam. There is also basilar compressive atelectasis. Mild emphysema, with subpleural reticular prominence at the lower lungs predominantly posteriorly with subpleural cysts indicating fibrosis. There are calcified plaques posteriorly and diaphragmatic probably from asbestosis exposure.   MEDIASTINUM and YC, LOWER NECK AND AXILLA: The visualized thyroid gland is within normal limits.   No evidence of thoracic lymphadenopathy by CT criteria.   HEART and VESSELS: The thoracic aorta is of normal course and caliber without significant atherosclerotic calcification .   Main pulmonary artery and its branches are normal in caliber.   Coronary artery calcification is present, patient is however status post median sternotomy.   The cardiac chambers are not enlarged.   UPPER ABDOMEN: There is a densely peripherally calcified exophytic right upper renal pole cyst. CT renal protocol would be recommended if further evaluation for Bosniak classification and malignant risk is needed.   CHEST WALL, OSSEOUS STRUCTURES AND OTHER FINDINGS: There are no suspicious osseous lesions.       1.  Bilateral effusions as described. 2. Emphysema with fibrosis. Calcified pleural plaques. 3. Right renal cysts with capsular calcification.   Signed by: Bahman Laughlin 2/20/2024  10:03 AM Dictation workstation:   [id number]    XR chest 1 view    Result Date: 2/18/2024  Interpreted By:  Rupert Eugene, STUDY: XR CHEST 1 VIEW;  2/18/2024 11:34 pm   INDICATION: Signs/Symptoms:Possible aspiration.   COMPARISON: 2/15/2024   ACCESSION NUMBER(S): [id number]   ORDERING CLINICIAN: BIANKA FARRIS   FINDINGS: Postsurgical change with sternotomy wires. There is stable cardiomegaly. There is interval bone opacity right mid and lower lung. There is small right pleural effusion. There is mild left basilar opacity and small left pleural effusion. No pneumothorax.       Interval development of opacity in the right mid and lower lung which may correspond to pneumonia. There are small pleural effusions and also mild left basilar atelectasis. Given the focal nature of the opacity in the right lung, short-term progress imaging is however recommended after treatment to assure resolution and exclude other pathology including mass.   MACRO: None   Signed by: Rupert Eugene 2/18/2024 11:52 PM Dictation workstation:   [id number]    ECG 12 lead    Result Date: 2/16/2024  Atrial fibrillation Anteroseptal infarct, old Repol abnrm suggests ischemia, diffuse leads See ED provider note for full interpretation and clinical correlation Confirmed by Charisma Díaz ([id number]) on 2/16/2024 5:30:36 PM    Transthoracic Echo (TTE) Complete    Result Date: 2/16/2024    [facility], [address] [place] [id number] [phone] and                                 Fax [phone] TRANSTHORACIC ECHOCARDIOGRAM REPORT  Patient Name:      VICTOR M Colbert Physician:    [id number] Kael Real MD Study Date:        2/16/2024            Ordering Provider:    [id number] REJI WASHINGTON MRN/PID:           [id number]             Fellow: Accession#:        [id number]         Nurse:                 Grace Matamoros RN Date of Birth/Age: 1940 / 83 years Sonographer:          Lorna Olivares RDCS Gender:            M                    Additional Staff: Height:            182.88 cm            Admit Date:           2/16/2024 Weight:            55.79 kg             Admission Status:     Inpatient -                                                               Routine BSA / BMI:         1.73 m2 / 16.68      Encounter#:           [id number]                    kg/m2                                         Department Location:  [facility] Blood Pressure: 112 /71 mmHg Study Type:    TRANSTHORACIC ECHO (TTE) COMPLETE Diagnosis/ICD: Other fluid overload-E87.79 Indication:    A-Fib AMS CPT Code:      Echo Complete w Full Doppler-[id number] Patient History: Pertinent History: CAD, PVD, A-Fib, HTN and Hyperlipidemia. Hx CABG, DM. Study Detail: The following Echo studies were performed: 2D, M-Mode, Doppler and               color flow. Technically challenging study due to patient lying in               supine position and body habitus. Definity used as a contrast               agent for endocardial border definition. Total contrast used for               this procedure was 2 mL via IV push.  PHYSICIAN INTERPRETATION: Left Ventricle: The left ventricular systolic function is moderately decreased, with an estimated ejection fraction of 30-35%. The patient is in atrial fibrillation which may influence the estimate of left ventricular function and transvalvular flows. There is global hypokinesis of the left ventricle with minor regional variations. The left ventricular cavity size is normal. Left ventricular diastolic filling was indeterminate. Distal LV septum and LV apex are hypokinetic. Left Atrium: The left atrium is moderately dilated. Right Ventricle: The right ventricle is normal in  size. There is reduced right ventricular systolic function. Right Atrium: The right atrium is mildly dilated. Aortic Valve: The aortic valve is trileaflet. There is mild aortic valve cusp calcification. There is mild aortic valve thickening. There is trace to mild aortic valve regurgitation. The peak instantaneous gradient of the aortic valve is 5.3 mmHg. Mitral Valve: The mitral valve is mildly thickened. The mitral valve spectral Doppler A-wave is absent, consistent with atrial fibrillation. There is mild mitral annular calcification. There is mild mitral valve regurgitation. Tricuspid Valve: The tricuspid valve is structurally normal. There is mild tricuspid regurgitation. The Doppler estimated RVSP is slightly elevated at 31.4 mmHg. Pulmonic Valve: The pulmonic valve is structurally normal. There is physiologic pulmonic valve regurgitation. Pericardium: There is no pericardial effusion noted. Aorta: The aortic root is normal. There is upper limits of normal dilatation of the ascending aorta. Systemic Veins: The inferior vena cava appears to be of normal size. In comparison to the previous echocardiogram(s): There are no prior studies on this patient for comparison purposes.  CONCLUSIONS:  1. Left ventricular systolic function is moderately decreased with a 30-35% estimated ejection fraction.  2. There is global hypokinesis of the left ventricle with minor regional variations.  3. Distal LV septum and LV apex are hypokinetic.  4. The patient is in atrial fibrillation which may influence the estimate of left ventricular function and transvalvular flows.  5. The left atrium is moderately dilated.  6. Absent A-wave on MV spectral Doppler tracing, consistent with atrial fibrillation.  7. There is reduced right ventricular systolic function.  8. Slightly elevated RVSP. QUANTITATIVE DATA SUMMARY: 2D MEASUREMENTS:                           Normal Ranges: Ao Root d:     3.60 cm    (2.0-3.7cm) LAs:           5.10 cm     (2.7-4.0cm) IVSd:          1.19 cm    (0.6-1.1cm) LVPWd:         1.11 cm    (0.6-1.1cm) LVIDd:         4.80 cm    (3.9-5.9cm) LVIDs:         4.07 cm LV Mass Index: 119.1 g/m2 LV % FS        15.2 % LA VOLUME:                               Normal Ranges: LA Vol A4C:        69.2 ml    (22+/-6mL/m2) LA Vol A2C:        91.1 ml LA Vol BP:         81.8 ml LA Vol Index A4C:  39.9ml/m2 LA Vol Index A2C:  52.6 ml/m2 LA Vol Index BP:   47.2 ml/m2 LA Area A4C:       23.7 cm2 LA Area A2C:       26.4 cm2 LA Major Axis A4C: 6.9 cm LA Major Axis A2C: 6.5 cm LA Volume Index:   43.4 ml/m2 LA Vol A4C:        64.0 ml LA Vol A2C:        84.8 ml RA VOLUME BY A/L METHOD:                       Normal Ranges: RA Area A4C: 19.0 cm2 M-MODE MEASUREMENTS:                  Normal Ranges: Ao Root: 3.80 cm (2.0-3.7cm) LAs:     4.50 cm (2.7-4.0cm) AORTA MEASUREMENTS:                    Normal Ranges: Asc Ao, d: 3.80 cm (2.1-3.4cm) LV SYSTOLIC FUNCTION BY 2D PLANIMETRY (MOD):                     Normal Ranges: EF-A4C View: 32.6 % (>=55%) EF-A2C View: 48.8 % EF-Biplane:  37.9 % AORTIC VALVE:                         Normal Ranges: AoV Vmax:      1.15 m/s (<=1.7m/s) AoV Peak P.3 mmHg (<20mmHg) LVOT Max Rob:  0.49 m/s (<=1.1m/s) LVOT VTI:      6.87 cm LVOT Diameter: 2.20 cm  (1.8-2.4cm) AoV Area,Vmax: 1.63 cm2 (2.5-4.5cm2) AORTIC INSUFFICIENCY: AI Vmax:       2.39 m/s AI Half-time:  420 msec AI Decel Rate: 167.00 cm/s2  RIGHT VENTRICLE: RV Basal 3.51 cm RV Mid   1.90 cm RV Major 7.6 cm TAPSE:   12.2 mm RV s'    0.08 m/s TRICUSPID VALVE/RVSP:                             Normal Ranges: Peak TR Velocity: 2.57 m/s RV Syst Pressure: 31.4 mmHg (< 30mmHg) IVC Diam:         1.64 cm  [id number] Kael Real MD Electronically signed on 2024 at 4:48:12 PM  ** Final **     CT head wo IV contrast    Result Date: 2/15/2024  Interpreted By:  Weston Suarez, STUDY: CT HEAD WO IV CONTRAST;  2/15/2024 11:15 pm   INDICATION: Signs/Symptoms:ams.   COMPARISON:  None.   ACCESSION NUMBER(S): [id number]   ORDERING CLINICIAN: JENNY MULLER   TECHNIQUE: Axial noncontrast CT images of the head.   FINDINGS: Gray-white matter differentiation is maintained. There are no extra-axial collections. No mass effect or midline shift. There is no hydrocephalus. There is prominence of the bifrontal extra-axial spaces and sylvian fissures which may represent focal cerebral volume loss.   HEMORRHAGE: No acute intracranial hemorrhage.   CALVARIUM: No depressed skull fracture.   EXTRACRANIAL SOFT TISSUES:... Unremarkable.   PARANASAL SINUSES/MASTOIDS: The visualized paranasal sinuses are well aerated. Mastoid air cells are clear.   ORBITS: Grossly normal.       No acute intracranial hemorrhage or acute cortical infarct.     Signed by: Weston Suarez 2/15/2024 11:58 PM Dictation workstation:   [id number]    XR chest 1 view    Result Date: 2/15/2024  Interpreted By:  Weston Suarez, STUDY: XR CHEST 1 VIEW;  2/15/2024 10:18 pm   INDICATION: Signs/Symptoms:ams.   COMPARISON: None.   ACCESSION NUMBER(S): [id number]   ORDERING CLINICIAN: JENNY MULLER   FINDINGS:   CARDIOMEDIASTINAL SILHOUETTE: Cardiomediastinal silhouette is normal in size and configuration taking into account portable technique and patient rotation. There are post CABG changes.   LUNGS/PLEURA: There are no consolidations. There is mild blunting of the costophrenic angles suggestive of small bilateral pleural effusions.. There is no demonstrated pneumothorax.     BONES: No evidence of acute osseous abnormality.       1. Suggestion of small bilateral pleural effusions. No evidence of consolidation.     Signed by: Weston Suarez 2/15/2024 10:54 PM Dictation workstation:   [id number]      Current Medications:   Scheduled medications  cefuroxime, 250 mg, oral, BID  docusate sodium, 100 mg, oral, BID  escitalopram, 10 mg, oral, Daily  magnesium sulfate, 4 g, intravenous, Once  mirtazapine, 15 mg, oral, Nightly  pantoprazole, 40 mg,  intravenous, BID      Continuous medications  dextrose 5%-0.45 % sodium chloride, 100 mL/hr, Last Rate: 100 mL/hr (02/21/24 3732)      PRN medications  PRN medications: albuterol, bisacodyl, OLANZapine, oxygen, polyethylene glycol    Assessment  Patient is -year-old (man/woman) with the following medical Problems:    Pulmonary fibrosis, calcified plaques consistent with exposure to asbestos  Small bilateral pleural effusions and loculated fluid along fissures likely from 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 No indication for antibiotics from pulmonary standpoint.   On room air. Cautious with fluid administration. Continue modified diet per SLP.   Recommend resuming home xarelto

## 2024-02-21 NOTE — PROGRESS NOTES
Occupational Therapy    Occupational Therapy    Evaluation    Patient Name: Moshe Velázquez  MRN: [id number]  Today's Date: 2/21/2024  Time Calculation  Start Time: 1055  Stop Time: 1116  Time Calculation (min): 21 min    Assessment  IP OT Assessment  OT Assessment: decreased adls  Prognosis: Good  End of Session Patient Position: Bed, 3 rail up, Alarm on (call light in reach. all needs mets)    Plan:  Treatment Interventions: ADL retraining, Functional transfer training, Endurance training  OT Frequency: 3 times per week  OT Discharge Recommendations: Moderate intensity level of continued care  OT - OK to Discharge: Yes (once cleared by medical team)    Subjective     Current Problem:  1. Acute metabolic encephalopathy        2. Altered mental status, unspecified altered mental status type        3. HONG (acute kidney injury) (CMS/HCC)        4. Elevated troponin        5. Pneumonia due to infectious organism, unspecified laterality, unspecified part of lung        6. Other fluid overload  Transthoracic Echo (TTE) Complete          General:  General  Reason for Referral: OT eval and treat-Pt. admitted from [facility] due to altered mental status, transiently hypoxic.   DX: acute metabolic encephalopathy. CT head=(-) CXR= rt. interstitial disease; small bilat. pleural effusion.  Referred By: Dr. Anderson  Past Medical History Relevant to Rehab: dementia, diverticulosis, PE, dvt, afib,dm, htn, cad, bph, lymphema, gerd, colitis, chf, sid, lt. knee sx, cabg  Prior to Session Communication: Bedside nurse (cleared for therapy evaluation per RN)  Patient Position Received: Bed, 3 rail up, Alarm off, not on at start of session    Precautions:  Medical Precautions: Fall precautions  Precautions Comment: michael    Objective     Cognition:  Overall Cognitive Status:  (flat affect; soft speech, difficult to understand at times. Ox3.)     Home Living:  Type of Home: Skilled Nursing facility     Prior Function:  Prior Function  Comments: Pt. states that he has assist of 2 with bed mobility/transfers/ambulation with rolling walker. Pt. ind. feeding self, has assist with bathing/dressing/toileting from staff.    ADL:  ADL Comments: sba drinking from cup. anticipate min. assist with upper body and max. assist with lower body adls. dep. with toileting-rodriguez catheter    Bed Mobility/Transfers:   Bed Mobility  Bed Mobility:  (min. assist supine<>sit)  Transfers  Transfer:  (sit to stand with min. assist.)    Ambulation/Gait Training:  Ambulation/Gait Training  Ambulation/Gait Training Performed:  (mod. assist of 1 side steps along eob with rolling walker. 1 lob noted.)    Sensation:  Light Touch: No apparent deficits    Strength:  Strength Comments: bue arom wfl. bue strength 4-/5    Hand Function:  Hand Function  Coordination: Functional    Outcome Measures: [facility] Daily Activity  Putting on and taking off regular lower body clothing: A lot  Bathing (including washing, rinsing, drying): A lot  Putting on and taking off regular upper body clothing: A little  Toileting, which includes using toilet, bedpan or urinal: Total  Taking care of personal grooming such as brushing teeth: A little  Eating Meals: A little  Daily Activity - Total Score: 14     EDUCATION:       Goals:   Encounter Problems       Encounter Problems (Active)       OT Goals       OT Goal 1 (Progressing)       Start:  02/21/24    Expected End:  03/06/24       Pt. will perform upper body bathing and dressing with sba using adaptive equipment as needed.           OT Goal 2 (Progressing)       Start:  02/21/24    Expected End:  03/06/24       Pt. Will perform feeding and grooming tasks with sba after set-up.         OT Goal 3 (Progressing)       Start:  02/21/24    Expected End:  03/06/24       Pt. will perform bed mobility/chair/commode transfers with cga.           OT Goal 4 (Progressing)       Start:  02/21/24    Expected End:  03/06/24       Pt. will perform functional mobility  with rolling walker with cga to access bathroom.           OT Goal 5 (Progressing)       Start:  02/21/24    Expected End:  03/06/24       Pt.  will perform BUE therapeutic exercises x 10 min. as tolerated to improve endurance for functional transfers/self-care tasks.

## 2024-02-21 NOTE — PROGRESS NOTES
"Speech-Language Pathology    Inpatient  Speech-Language Pathology Treatment     Patient Name: Moshe Velázquez  MRN: [id number]  Today's Date: 2/21/2024  Time Calculation  Start Time: 1440  Stop Time: 1453  Time Calculation (min): 13 min         Current Problem:   1. Acute metabolic encephalopathy  albuterol 2.5 mg /3 mL (0.083 %) nebulizer solution    bisacodyl (Dulcolax) 5 mg EC tablet    cefuroxime (Ceftin) 250 mg tablet    docusate sodium (Colace) 100 mg capsule    oxygen (O2) gas therapy    CBC    Comprehensive metabolic panel    CBC    Comprehensive metabolic panel      2. Altered mental status, unspecified altered mental status type        3. HONG (acute kidney injury) (CMS/HCC)        4. Elevated troponin        5. Pneumonia due to infectious organism, unspecified laterality, unspecified part of lung        6. Other fluid overload  Transthoracic Echo (TTE) Complete            SLP Assessment:   ST arrived into patient's room and observed lunch tray still present and mostly untouched. Patient asking for crackers to eat. Patient reports that he always eats with his dentures in. Attempted to place full dentures again this date, however, upper dentures remain severely ill-fitting. Patient eventually removed them. Patient with some confusion noted and he is unwilling to participate with therapist despite max encouragement. Patient would not consume cracker while therapist present. Cup sips of thin liquids were tolerated well as evidenced by no overt or subtle s/s of aspiration.     ST discussed current diet level recommendations and asked if pureed solid textures were helpful for easing mastication. Patient stating, \"it doesn't matter; I can't taste anything and I don't eat much. Even if it was prime rib, I wouldn't eat it.\"    Recommendations:  At this time, would advise patient continue on current diet level of pureed solids for ease of mastication d/t severely ill-fitting dentures. Continue with thin liquids. " Consider follow-up with ST at [facility] to determine if any advanced solids for pleasure could be safely consumed by patient without use of dentures (soft cake, soaked/dunked cookies/crackers, etc.). No further ST follow-up in acute care setting.       Plan:  SLP [place] Plan: Discharge from Speech Therapy  SLP Plan: No skilled SLP  SLP Discharge Recommendations: Skilled nursing facility placement      Subjective   Current Problem:  Rule out aspiration    Most Recent Visit:  SLP Received On: 02/21/24      Objective   Patient talkative; some mild confusion.     Therapeutic Swallow:  Solid Diet Recommendations: Pureed/extremely thick  (IDDSI Level 4)  Liquid Diet Recommendations: Thin (IDDSI Level 0)      Inpatient:  Education Comments  Patient is not receptive to education and encouragement to participate in PO trials with therapist.      Consultations/Referrals/Coordination of Services: Dietician consult to maximize caloric intake. Patient reports he has lost 70#. Patient unable to provide further details.

## 2024-02-21 NOTE — PROGRESS NOTES
Physical Therapy    Physical Therapy Evaluation    Patient Name: Moshe Velázquez  MRN: [id number]  Today's Date: 2/21/2024   Time Calculation  Start Time: 1056  Stop Time: 1116  Time Calculation (min): 20 min    Assessment/Plan   PT Assessment  PT Assessment Results: Decreased strength, Decreased endurance, Impaired balance, Decreased mobility, Decreased cognition  End of Session Communication: Bedside nurse  End of Session Patient Position: Bed, 3 rail up, Alarm on  IP OR SWING BED PT PLAN  Inpatient or Swing Bed: Inpatient  PT Plan  Treatment/Interventions: Bed mobility, Transfer training, Gait training, Strengthening  PT Plan: Skilled PT  PT Frequency: 3 times per week  PT Discharge Recommendations: Moderate intensity level of continued care  PT - OK to Discharge: Yes      Subjective   General Visit Information:  General  Reason for Referral: [facility] status, transiently hypoxic. DX: acute metabolic encephalopathy. CT head=(-) CXR= rt. interstitial disease; small bilat. pleural effusion.  Past Medical History Relevant to Rehab: dementia, diverticulosis, PE, dvt, afib,dm, htn, cad, bph, lymphema, gerd, colitis, chf, sid, lt. knee sx, cabg  Prior to Session Communication: Bedside nurse  Patient Position Received: Bed, 3 rail up, Alarm on  Home Living:  Home Living  Type of Home: Skilled Nursing facility  Prior Level of Function:  Prior Function Per Pt/Caregiver Report  Level of [place]:  (assist of 2 for fxal mob w/ rw)  Precautions:  Precautions  Precautions Comment:  (falls, rodriguez cath, puree/thin diet)  Vital Signs:       Objective   Pain:     Cognition:  Cognition  Overall Cognitive Status:  (flat affect; soft speech, difficult to understand at times. Ox3.)      Functional Assessments:  Bed Mobility  Bed Mobility:  (min of  1)    Transfers  Transfer:  (min of 1)    Ambulation/Gait Training  Ambulation/Gait Training Performed:  (min of 1 w/ rw side steps along bed  3ft, lob, high fall risk)  Extremity/Trunk Assessments:  RLE wfl  RLE : Within Functional Limits     Outcome Measures:       Encounter Problems       Encounter Problems (Active)       PT Problem       Indep bed mob       PT Goal 1 (Progressing)       Start:  02/21/24    Expected End:  03/06/24       Sba txs          PT Goal 2 (Progressing)       Start:  02/21/24    Expected End:  03/06/24       Min of 1 w/. Rw 100ft x3 w/ min of 1          PT Goal 3 (Progressing)       Start:  02/21/24    Expected End:  03/06/24       20-30 reps ble there ex         PT Goal 4 (Progressing)       Start:  02/21/24    Expected End:  03/06/24               Pain - Adult              Education Documentation  Mobility Training, taught by Rabia Bonilla, PT at 2/21/2024  2:04 PM.  Learner: Patient  Readiness: Acceptance  Method: Explanation  Response: Verbalizes Understanding    Education Comments  No comments found.

## 2024-03-06 ENCOUNTER — TELEPHONE (OUTPATIENT)
Dept: PRIMARY CARE | Facility: CLINIC | Age: 84
End: 2024-03-06
Payer: MEDICARE

## 2024-03-06 NOTE — TELEPHONE ENCOUNTER
TCM, discharged on 2/21/24, patient is in hospice according to his daughter Luisana and he is under another providers care. Luisana said that he is better off without a follow up since he is already being taken care of

## 2024-04-11 DIAGNOSIS — F32.A DEPRESSION, UNSPECIFIED DEPRESSION TYPE: ICD-10-CM

## 2024-04-12 RX ORDER — ESCITALOPRAM OXALATE 10 MG/1
10 TABLET ORAL DAILY
Qty: 90 TABLET | Refills: 1 | Status: SHIPPED | OUTPATIENT
Start: 2024-04-12

## 2024-08-09 NOTE — CONSULTS
normal mood with appropriate affect  “Wound” Ostomy Consultation        1. Chief Complaint: wound check and evaluation   2. History of Present Illness:   coccyx pressure injury   3. Past Medical History: Reviewed   4. Past Surgical History: Reviewed  5. Allergies:     Reviewed  6. Social/Family History: Reviewed  7. Medications:  Reviewed  8. Labs/Data/Test Results: Reviewed   9. Progress Notes:  Reviewed     10. System Review: system review was performed with these findings:   Integumentary: Will be described below    11.  Physical Examination:   Integumentary: Normal skin color, texture, and turgor, No dry/scaly/cracked skin; No ecchymotic areas; No friable skin; No rash/lesions/excoriation/burns; No diaphoresis; No jaundice, susan, pallor skin;     12. Wound Pain/Discomfort: No      13. Wound Assessment  Type of Wound: pressure   Stage/Thickness Level: III  Site: coccyx   Present on admission: yes   Measurements: Length: 0.8 cm Width: 0.5 cm Depth: <0.25cm    Tunneling:  No    Undermining: No     Edges: pink,   Odor: none,   Edema:  not present  Exudate: none  Tissue Color: pale pink   Tissue Type: granulation,   Kalani Wound Tissue: No erythema or warmth, indurated tissue, macerated tissue, desiccated tissue, healthy scar tissue??????????????????????????  Kalani Wound Tissue Color: pink,     Assessment/Plan/Treatment Recommendations:     Continue with critic aid barrier ointment 3 times a day   Turn as per policy offloading from pressure sites(s)  Interventions as per Jeronimo Scale are in place    Education provided; Treatment demonstrated; Questions answered  D/W RN      I have spent 20 minutes with the patient for physical and wound assessment, wound cleaning, unable to provide education     Dayanna Polanco, [facility]

## 2025-09-03 ENCOUNTER — TELEPHONE (OUTPATIENT)
Dept: PRIMARY CARE | Facility: CLINIC | Age: 85
End: 2025-09-03
Payer: MEDICARE